# Patient Record
Sex: MALE | Race: WHITE | NOT HISPANIC OR LATINO | Employment: FULL TIME | URBAN - METROPOLITAN AREA
[De-identification: names, ages, dates, MRNs, and addresses within clinical notes are randomized per-mention and may not be internally consistent; named-entity substitution may affect disease eponyms.]

---

## 2020-02-19 ENCOUNTER — TELEPHONE (OUTPATIENT)
Dept: FAMILY MEDICINE CLINIC | Facility: CLINIC | Age: 50
End: 2020-02-19

## 2020-02-21 ENCOUNTER — OFFICE VISIT (OUTPATIENT)
Dept: FAMILY MEDICINE CLINIC | Facility: CLINIC | Age: 50
End: 2020-02-21
Payer: COMMERCIAL

## 2020-02-21 VITALS
HEIGHT: 71 IN | OXYGEN SATURATION: 99 % | SYSTOLIC BLOOD PRESSURE: 126 MMHG | WEIGHT: 237.4 LBS | DIASTOLIC BLOOD PRESSURE: 76 MMHG | TEMPERATURE: 98.1 F | BODY MASS INDEX: 33.23 KG/M2 | HEART RATE: 75 BPM

## 2020-02-21 DIAGNOSIS — Z13.29 SCREENING FOR THYROID DISORDER: ICD-10-CM

## 2020-02-21 DIAGNOSIS — Z13.1 SCREENING FOR DIABETES MELLITUS: ICD-10-CM

## 2020-02-21 DIAGNOSIS — Z13.220 SCREENING FOR LIPID DISORDERS: ICD-10-CM

## 2020-02-21 DIAGNOSIS — Z13.6 SCREENING FOR CARDIOVASCULAR CONDITION: ICD-10-CM

## 2020-02-21 DIAGNOSIS — Z00.00 ANNUAL PHYSICAL EXAM: Primary | ICD-10-CM

## 2020-02-21 PROCEDURE — 99386 PREV VISIT NEW AGE 40-64: CPT | Performed by: NURSE PRACTITIONER

## 2020-02-21 PROCEDURE — 3008F BODY MASS INDEX DOCD: CPT | Performed by: NURSE PRACTITIONER

## 2020-02-21 NOTE — PATIENT INSTRUCTIONS

## 2020-02-21 NOTE — PROGRESS NOTES
ADULT ANNUAL [facility]    NAME: Simba Prabhakar  AGE: 51 y o  SEX: male  : 1970     DATE: 2020    Patient presents today for an annual physical   Establishing primary care  Several years since his last exam and or any blood work, other than biometric screenings through his employer  He does not have those blood work results, but states he believes they were normal   Physical assessment today as below  Screening lab work orders placed  Will fill out biometric form and fax once results received  Health maintenance reviewed  Overdue for dental and vision by 1 year  Patient does wear contacts  Denies any visual concerns  BMI noted:  33  Diet and exercise reviewed  Patient has lost 20-25 lb in the last few months  He currently eats a low carb diet that was started in October  No healthcare concerns today  Reassess in 1 year for annual physical   Return sooner as needed     Assessment and Plan:     Problem List Items Addressed This Visit     None      Visit Diagnoses     Annual physical exam    -  Primary    BMI 33 0-33 9,adult        Screening for lipid disorders        Relevant Orders    Lipid panel    Screening for thyroid disorder        Relevant Orders    TSH, 3rd generation with Free T4 reflex    Screening for diabetes mellitus        Relevant Orders    Comprehensive metabolic panel    Screening for cardiovascular condition        Relevant Orders    CBC and differential          Immunizations and preventive care screenings were discussed with patient today  Appropriate education was printed on patient's after visit summary  Counseling:  Alcohol/drug use: discussed moderation in alcohol intake, the recommendations for healthy alcohol use, and avoidance of illicit drug use  Dental Health: discussed importance of regular tooth brushing, flossing, and dental visits    · Exercise: the importance of regular exercise/physical activity was discussed  Recommend exercise 3-5 times per week for at least 30 minutes  BMI Counseling: Body mass index is 33 11 kg/m²  The BMI is above normal  Nutrition recommendations include decreasing portion sizes, encouraging healthy choices of fruits and vegetables, decreasing fast food intake and reducing intake of saturated and trans fat  Exercise recommendations include moderate physical activity 150 minutes/week  No pharmacotherapy was ordered  Return in 1 year (on 2/21/2021)  Chief Complaint:     Chief Complaint   Patient presents with    Physical Exam     Biometric Screening for work  He would like to establish care with our office  History of Present Illness:     Adult Annual Physical   Patient here for a comprehensive physical exam  The patient reports no problems  Diet and Physical Activity  · Diet/Nutrition: well balanced diet, low carb diet and consuming 3-5 servings of fruits/vegetables daily  · Exercise: walking and 3-4 times a week on average  Depression Screening  PHQ-9 Depression Screening    PHQ-9:    Frequency of the following problems over the past two weeks:       Little interest or pleasure in doing things:  0 - not at all  Feeling down, depressed, or hopeless:  0 - not at all  PHQ-2 Score:  0       General Health  · Sleep: gets 4-6 hours of sleep on average and Sleeps okay  Does a lot of traveling, so notes difficulty sleeping in different hotels at times  Sleeps short intervals at times  Recommend melatonin  · Hearing: normal - bilateral   · Vision: most recent eye exam >1 year ago and wears contacts  · Dental: no dental visits for >1 year   Health  · Symptoms include: none     Review of Systems:     Review of Systems   Constitutional: Negative  HENT: Negative  Respiratory: Negative  Cardiovascular: Negative  Gastrointestinal: Negative  Genitourinary: Negative  Neurological: Negative  Psychiatric/Behavioral: Negative  Past Medical History:     History reviewed  No pertinent past medical history  Past Surgical History:     History reviewed  No pertinent surgical history  Family History:     Family History   Problem Relation Age of Onset    Heart disease Father       Social History:        Social History     Socioeconomic History    Marital status: /Civil Union     Spouse name: None    Number of children: None    Years of education: None    Highest education level: None   Occupational History    None   Social Needs    Financial resource strain: None    Food insecurity:     Worry: None     Inability: None    Transportation needs:     Medical: None     Non-medical: None   Tobacco Use    Smoking status: Never Smoker    Smokeless tobacco: Never Used   Substance and Sexual Activity    Alcohol use: Yes     Frequency: 2-3 times a week     Drinks per session: 1 or 2    Drug use: Never    Sexual activity: Yes   Lifestyle    Physical activity:     Days per week: None     Minutes per session: None    Stress: None   Relationships    Social connections:     Talks on phone: None     Gets together: None     Attends Holiness service: None     Active member of club or organization: None     Attends meetings of clubs or organizations: None     Relationship status: None    Intimate partner violence:     Fear of current or ex partner: None     Emotionally abused: None     Physically abused: None     Forced sexual activity: None   Other Topics Concern    None   Social History Narrative    None      Current Medications:     No current outpatient medications on file  No current facility-administered medications for this visit         Allergies:     No Known Allergies   Physical Exam:     /76 (BP Location: Left arm, Patient Position: Sitting, Cuff Size: Adult)   Pulse 75   Temp 98 1 °F (36 7 °C)   Ht 5' 11" (1 803 m)   Wt 108 kg (237 lb 6 4 oz)   SpO2 99%   BMI 33 11 kg/m²     Physical Exam   Constitutional: He is oriented to person, place, and time  Vital signs are normal  He appears well-developed and well-nourished  HENT:   Right Ear: Tympanic membrane and ear canal normal    Left Ear: Tympanic membrane and ear canal normal    Nose: Nose normal    Mouth/Throat: Uvula is midline, oropharynx is clear and moist and mucous membranes are normal    Eyes: Pupils are equal, round, and reactive to light  Conjunctivae and EOM are normal    Neck: Carotid bruit is not present  No thyromegaly present  Cardiovascular: Normal rate, regular rhythm and normal heart sounds  Negative for lower extremity edema   Pulmonary/Chest: Effort normal and breath sounds normal  No respiratory distress  Lymphadenopathy:        Head (right side): No submandibular and no tonsillar adenopathy present  Head (left side): No submandibular and no tonsillar adenopathy present  He has no cervical adenopathy  Neurological: He is alert and oriented to person, place, and time  He has normal strength  No cranial nerve deficit or sensory deficit  Coordination and gait normal    Reflex Scores:       Brachioradialis reflexes are 2+ on the right side and 2+ on the left side  Patellar reflexes are 2+ on the right side and 2+ on the left side  Skin: Skin is warm, dry and intact  Scattered, normal appearing nevi  Patient does follow with Dermatology for yearly mapping   Psychiatric: He has a normal mood and affect  Thought content normal    Nursing note and vitals reviewed        KHADRA Barbosa  [facility] [address] [facility] [address]

## 2020-02-25 DIAGNOSIS — R73.01 ELEVATED FASTING GLUCOSE: Primary | ICD-10-CM

## 2020-02-25 LAB
ALBUMIN SERPL-MCNC: 4.5 G/DL (ref 3.6–5.1)
ALBUMIN/GLOB SERPL: 1.8 (CALC) (ref 1–2.5)
ALP SERPL-CCNC: 90 U/L (ref 36–130)
ALT SERPL-CCNC: 28 U/L (ref 9–46)
AST SERPL-CCNC: 25 U/L (ref 10–40)
BASOPHILS # BLD AUTO: 42 CELLS/UL (ref 0–200)
BASOPHILS NFR BLD AUTO: 0.6 %
BILIRUB SERPL-MCNC: 0.3 MG/DL (ref 0.2–1.2)
BUN SERPL-MCNC: 18 MG/DL (ref 7–25)
BUN/CREAT SERPL: ABNORMAL (CALC) (ref 6–22)
CALCIUM SERPL-MCNC: 10 MG/DL (ref 8.6–10.3)
CHLORIDE SERPL-SCNC: 107 MMOL/L (ref 98–110)
CHOLEST SERPL-MCNC: 215 MG/DL
CHOLEST/HDLC SERPL: 4.8 (CALC)
CO2 SERPL-SCNC: 25 MMOL/L (ref 20–32)
CREAT SERPL-MCNC: 0.89 MG/DL (ref 0.6–1.35)
EOSINOPHIL # BLD AUTO: 140 CELLS/UL (ref 15–500)
EOSINOPHIL NFR BLD AUTO: 2 %
ERYTHROCYTE [DISTWIDTH] IN BLOOD BY AUTOMATED COUNT: 12.5 % (ref 11–15)
GLOBULIN SER CALC-MCNC: 2.5 G/DL (CALC) (ref 1.9–3.7)
GLUCOSE SERPL-MCNC: 104 MG/DL (ref 65–99)
HCT VFR BLD AUTO: 46.8 % (ref 38.5–50)
HDLC SERPL-MCNC: 45 MG/DL
HGB BLD-MCNC: 15.6 G/DL (ref 13.2–17.1)
LDLC SERPL CALC-MCNC: 147 MG/DL (CALC)
LYMPHOCYTES # BLD AUTO: 1659 CELLS/UL (ref 850–3900)
LYMPHOCYTES NFR BLD AUTO: 23.7 %
MCH RBC QN AUTO: 30.4 PG (ref 27–33)
MCHC RBC AUTO-ENTMCNC: 33.3 G/DL (ref 32–36)
MCV RBC AUTO: 91.1 FL (ref 80–100)
MONOCYTES # BLD AUTO: 644 CELLS/UL (ref 200–950)
MONOCYTES NFR BLD AUTO: 9.2 %
NEUTROPHILS # BLD AUTO: 4515 CELLS/UL (ref 1500–7800)
NEUTROPHILS NFR BLD AUTO: 64.5 %
NONHDLC SERPL-MCNC: 170 MG/DL (CALC)
PLATELET # BLD AUTO: 335 THOUSAND/UL (ref 140–400)
PMV BLD REES-ECKER: 10.3 FL (ref 7.5–12.5)
POTASSIUM SERPL-SCNC: 5.2 MMOL/L (ref 3.5–5.3)
PROT SERPL-MCNC: 7 G/DL (ref 6.1–8.1)
RBC # BLD AUTO: 5.14 MILLION/UL (ref 4.2–5.8)
SL AMB EGFR AFRICAN AMERICAN: 117 ML/MIN/1.73M2
SL AMB EGFR NON AFRICAN AMERICAN: 101 ML/MIN/1.73M2
SODIUM SERPL-SCNC: 139 MMOL/L (ref 135–146)
TRIGL SERPL-MCNC: 114 MG/DL
TSH SERPL-ACNC: 3.73 MIU/L (ref 0.4–4.5)
WBC # BLD AUTO: 7 THOUSAND/UL (ref 3.8–10.8)

## 2020-03-13 LAB
EST. AVERAGE GLUCOSE BLD GHB EST-MCNC: 105 (CALC)
EST. AVERAGE GLUCOSE BLD GHB EST-SCNC: 5.8 (CALC)
HBA1C MFR BLD: 5.3 % OF TOTAL HGB

## 2020-07-15 ENCOUNTER — DOCTOR'S OFFICE (OUTPATIENT)
Dept: URBAN - METROPOLITAN AREA CLINIC 153 | Facility: CLINIC | Age: 50
Setting detail: OPHTHALMOLOGY
End: 2020-07-15

## 2020-07-15 DIAGNOSIS — H52.13: ICD-10-CM

## 2020-07-15 DIAGNOSIS — H52.4: ICD-10-CM

## 2020-07-15 PROCEDURE — SELFPAYVIS VISION VISIT SELF PAY: Performed by: OPTOMETRIST

## 2020-07-15 PROCEDURE — 92310 CONTACT LENS FITTING OU: CPT | Performed by: OPTOMETRIST

## 2020-07-15 ASSESSMENT — REFRACTION_MANIFEST
OS_VA1: 20/20
OD_ADD: +2.50
OS_AXIS: 020
OS_CYLINDER: -0.75
OD_VA1: 20/20
OD_AXIS: 110
OU_VA: 20/20
OD_SPHERE: -1.75
OS_ADD: +2.50
OS_SPHERE: -1.75
OD_CYLINDER: -0.75

## 2020-07-15 ASSESSMENT — REFRACTION_AUTOREFRACTION
OS_CYLINDER: -0.75
OD_AXIS: 113
OS_SPHERE: -1.75
OD_SPHERE: -1.75
OS_AXIS: 024
OD_CYLINDER: -0.75

## 2020-07-15 ASSESSMENT — REFRACTION_CURRENTRX
OS_SPHERE: -1.75
OD_SPHERE: -2.00
OD_AXIS: 110
OD_ADD: +2.50
OS_OVR_VA: 20/
OS_CYLINDER: -0.75
OS_ADD: +2.50
OD_OVR_VA: 20/
OS_AXIS: 020
OD_CYLINDER: -0.75

## 2020-07-15 ASSESSMENT — VISUAL ACUITY
OD_BCVA: 20/20
OS_BCVA: 20/20-1

## 2020-07-15 ASSESSMENT — SPHEQUIV_DERIVED
OD_SPHEQUIV: -2.125
OS_SPHEQUIV: -2.125
OD_SPHEQUIV: -2.125
OS_SPHEQUIV: -2.125

## 2020-07-15 ASSESSMENT — CONFRONTATIONAL VISUAL FIELD TEST (CVF)
OD_FINDINGS: FULL
OS_FINDINGS: FULL

## 2020-10-06 ENCOUNTER — IMMUNIZATIONS (OUTPATIENT)
Dept: FAMILY MEDICINE CLINIC | Facility: CLINIC | Age: 50
End: 2020-10-06
Payer: COMMERCIAL

## 2020-10-06 DIAGNOSIS — Z23 NEED FOR VACCINATION: Primary | ICD-10-CM

## 2020-10-06 PROCEDURE — 90686 IIV4 VACC NO PRSV 0.5 ML IM: CPT | Performed by: NURSE PRACTITIONER

## 2020-10-06 PROCEDURE — 90471 IMMUNIZATION ADMIN: CPT | Performed by: NURSE PRACTITIONER

## 2020-11-02 ENCOUNTER — OFFICE VISIT (OUTPATIENT)
Dept: FAMILY MEDICINE CLINIC | Facility: CLINIC | Age: 50
End: 2020-11-02
Payer: COMMERCIAL

## 2020-11-02 VITALS
SYSTOLIC BLOOD PRESSURE: 122 MMHG | WEIGHT: 242.6 LBS | OXYGEN SATURATION: 98 % | BODY MASS INDEX: 33.96 KG/M2 | DIASTOLIC BLOOD PRESSURE: 90 MMHG | HEART RATE: 84 BPM | HEIGHT: 71 IN | TEMPERATURE: 97.1 F

## 2020-11-02 DIAGNOSIS — H00.015 HORDEOLUM EXTERNUM OF LEFT LOWER EYELID: Primary | ICD-10-CM

## 2020-11-02 PROCEDURE — 1036F TOBACCO NON-USER: CPT | Performed by: NURSE PRACTITIONER

## 2020-11-02 PROCEDURE — 99213 OFFICE O/P EST LOW 20 MIN: CPT | Performed by: NURSE PRACTITIONER

## 2020-11-02 PROCEDURE — 3008F BODY MASS INDEX DOCD: CPT | Performed by: NURSE PRACTITIONER

## 2020-11-02 RX ORDER — OFLOXACIN 3 MG/ML
1 SOLUTION/ DROPS OPHTHALMIC 4 TIMES DAILY
Qty: 5 ML | Refills: 0 | Status: SHIPPED | OUTPATIENT
Start: 2020-11-02

## 2024-03-08 ENCOUNTER — OFFICE VISIT (OUTPATIENT)
Dept: FAMILY MEDICINE CLINIC | Facility: CLINIC | Age: 54
End: 2024-03-08
Payer: COMMERCIAL

## 2024-03-08 VITALS
OXYGEN SATURATION: 98 % | BODY MASS INDEX: 35.13 KG/M2 | HEART RATE: 100 BPM | TEMPERATURE: 97.5 F | HEIGHT: 70 IN | WEIGHT: 245.4 LBS | SYSTOLIC BLOOD PRESSURE: 120 MMHG | DIASTOLIC BLOOD PRESSURE: 76 MMHG

## 2024-03-08 DIAGNOSIS — Z13.1 SCREENING FOR DIABETES MELLITUS: ICD-10-CM

## 2024-03-08 DIAGNOSIS — Z23 ENCOUNTER FOR IMMUNIZATION: ICD-10-CM

## 2024-03-08 DIAGNOSIS — Z13.29 SCREENING FOR THYROID DISORDER: ICD-10-CM

## 2024-03-08 DIAGNOSIS — Z12.11 SCREEN FOR COLON CANCER: ICD-10-CM

## 2024-03-08 DIAGNOSIS — Z12.5 SCREENING FOR PROSTATE CANCER: ICD-10-CM

## 2024-03-08 DIAGNOSIS — Z13.0 SCREENING, ANEMIA, DEFICIENCY, IRON: ICD-10-CM

## 2024-03-08 DIAGNOSIS — E78.5 HYPERLIPIDEMIA, UNSPECIFIED HYPERLIPIDEMIA TYPE: ICD-10-CM

## 2024-03-08 DIAGNOSIS — Z00.00 ANNUAL PHYSICAL EXAM: Primary | ICD-10-CM

## 2024-03-08 DIAGNOSIS — E55.9 VITAMIN D DEFICIENCY: ICD-10-CM

## 2024-03-08 PROCEDURE — 90750 HZV VACC RECOMBINANT IM: CPT

## 2024-03-08 PROCEDURE — 90471 IMMUNIZATION ADMIN: CPT

## 2024-03-08 PROCEDURE — 99396 PREV VISIT EST AGE 40-64: CPT | Performed by: NURSE PRACTITIONER

## 2024-03-08 NOTE — PROGRESS NOTES
ADULT ANNUAL PHYSICAL  [facility]    NAME: Enrrique Diaz  AGE: 53 y.o. SEX: male  : 1970     DATE: 3/8/2024     Assessment and Plan:   Comprehensive physical exam  PMH and chronic conditions reviewed  Medications reconciled  Preventative care and recommended screenings as below   Fasting lab orders placed  Continue annual physicals  Follow-up sooner as needed  Problem List Items Addressed This Visit    None  Visit Diagnoses       Annual physical exam    -  Primary    Screening, anemia, deficiency, iron        Relevant Orders    CBC and differential    Screening for diabetes mellitus        Relevant Orders    Comprehensive metabolic panel    Screening for thyroid disorder        Relevant Orders    TSH, 3rd generation with Free T4 reflex    Hyperlipidemia, unspecified hyperlipidemia type        Relevant Orders    Lipid panel    Screen for colon cancer        Relevant Orders    Ambulatory Referral to Gastroenterology    Encounter for immunization        Relevant Orders    Zoster Vaccine Recombinant IM    Screening for prostate cancer        Relevant Orders    PSA, Total Screen    Vitamin D deficiency        Relevant Orders    Vitamin D 25 hydroxy              Immunizations and preventive care screenings were discussed with patient today. Appropriate education was printed on patient's after visit summary.    Discussed risks and benefits of prostate cancer screening. We discussed the controversial history of PSA screening for prostate cancer in the [place] as well as the risk of over detection and over treatment of prostate cancer by way of PSA screening.  The patient understands that PSA blood testing is an imperfect way to screen for prostate cancer and that elevated PSA levels in the blood may also be caused by infection, inflammation, prostatic trauma or manipulation, urological procedures, or by benign prostatic enlargement.    The role of the  digital rectal examination in prostate cancer screening was also discussed and I discussed with him that there is large interobserver variability in the findings of digital rectal examination.    Counseling:  Alcohol/drug use: discussed moderation in alcohol intake, the recommendations for healthy alcohol use, and avoidance of illicit drug use.  Dental Health: discussed importance of regular tooth brushing, flossing, and dental visits.  Injury prevention: discussed safety/seat belts, safety helmets, smoke detectors, carbon dioxide detectors, and smoking near bedding or upholstery.  Sexual health: discussed sexually transmitted diseases, partner selection, use of condoms, avoidance of unintended pregnancy, and contraceptive alternatives.  Exercise: the importance of regular exercise/physical activity was discussed. Recommend exercise 3-5 times per week for at least 30 minutes.       Depression Screening and Follow-up Plan: Patient was screened for depression during today's encounter. They screened negative with a PHQ-2 score of 0.        Return in about 1 year (around 3/8/2025) for Annual physical.     Chief Complaint:     Chief Complaint   Patient presents with    Physical Exam      History of Present Illness:     Adult Annual Physical   Patient here for a comprehensive physical exam. The patient reports no problems.    Diet and Physical Activity  Diet/Nutrition: well balanced diet and working on healthy change .   Exercise: walking.      Depression Screening  PHQ-2/9 Depression Screening    Little interest or pleasure in doing things: 0 - not at all  Feeling down, depressed, or hopeless: 0 - not at all  PHQ-2 Score: 0  PHQ-2 Interpretation: Negative depression screen       General Health  Sleep: sleeps well and does travel for work - average one week monthly .   Hearing: normal - bilateral.  Vision: no vision problems, goes for regular eye exams, wears glasses, and mono vision .   Dental: regular dental visits.     Immunizations:  Tdap 2017; Shingrex series started today     Health  Symptoms include: none    Advanced Care Planning  Do you have an advanced directive? no  Do you have a durable medical power of ? no  ACP document given to patient? no     Review of Systems:     Review of Systems   Constitutional:  Positive for fatigue. Negative for activity change and appetite change. Unexpected weight change: 8 pounds since last physical 2020.  HENT: Negative.     Eyes: Negative.    Respiratory: Negative.     Cardiovascular: Negative.    Gastrointestinal: Negative.    Genitourinary: Negative.    Musculoskeletal: Negative.    Skin: Negative.    Neurological: Negative.    Psychiatric/Behavioral: Negative.        Past Medical History:     History reviewed. No pertinent past medical history.   Past Surgical History:     History reviewed. No pertinent surgical history.   Family History:     Family History   Problem Relation Age of Onset    Heart disease Father       Social History:     Social History     Socioeconomic History    Marital status: /Civil Union     Spouse name: None    Number of children: None    Years of education: None    Highest education level: None   Occupational History    None   Tobacco Use    Smoking status: Never    Smokeless tobacco: Never   Vaping Use    Vaping status: Never Used   Substance and Sexual Activity    Alcohol use: Yes    Drug use: Never    Sexual activity: Yes   Other Topics Concern    None   Social History Narrative    None     Social Determinants of Health     Financial Resource Strain: Not on file   Food Insecurity: Not on file   Transportation Needs: Not on file   Physical Activity: Not on file   Stress: Not on file   Social Connections: Not on file   Intimate Partner Violence: Not on file   Housing Stability: Not on file      Current Medications:     No current outpatient medications on file.     No current facility-administered medications for this visit.      Allergies:  "    No Known Allergies   Physical Exam:     /76 (BP Location: Left arm, Patient Position: Sitting, Cuff Size: Adult)   Pulse 100   Temp 97.5 °F (36.4 °C)   Ht 5' 10\" (1.778 m)   Wt 111 kg (245 lb 6.4 oz)   SpO2 98%   BMI 35.21 kg/m²     Physical Exam  Vitals and nursing note reviewed.   Constitutional:       General: He is not in acute distress.     Appearance: Normal appearance. He is well-developed and well-groomed. He is not ill-appearing.   HENT:      Head: Normocephalic.      Right Ear: Tympanic membrane, ear canal and external ear normal.      Left Ear: Tympanic membrane, ear canal and external ear normal.      Nose: Nose normal.      Mouth/Throat:      Mouth: Mucous membranes are moist.      Pharynx: Oropharynx is clear.   Eyes:      Conjunctiva/sclera: Conjunctivae normal.      Pupils: Pupils are equal, round, and reactive to light.   Neck:      Thyroid: No thyromegaly.      Vascular: No carotid bruit.   Cardiovascular:      Rate and Rhythm: Normal rate and regular rhythm.      Pulses:           Posterior tibial pulses are 2+ on the right side and 2+ on the left side.      Heart sounds: Normal heart sounds.   Pulmonary:      Effort: Pulmonary effort is normal. No respiratory distress.      Breath sounds: Normal breath sounds and air entry.   Musculoskeletal:      Right lower leg: No edema.      Left lower leg: No edema.   Lymphadenopathy:      Cervical: No cervical adenopathy.   Skin:     General: Skin is warm and dry.      Comments: Multiple Nevi  Gets yearly skin checks: [facility]   Neurological:      General: No focal deficit present.      Mental Status: He is alert and oriented to person, place, and time.   Psychiatric:         Attention and Perception: Attention normal.         Mood and Affect: Mood normal.         Behavior: Behavior normal.         Thought Content: Thought content normal.         Judgment: Judgment normal.          KHADRA Uribe  [facility] " [facility]

## 2024-03-12 ENCOUNTER — PREP FOR PROCEDURE (OUTPATIENT)
Age: 54
End: 2024-03-12

## 2024-03-12 ENCOUNTER — TELEPHONE (OUTPATIENT)
Age: 54
End: 2024-03-12

## 2024-03-12 DIAGNOSIS — Z12.11 SCREENING FOR COLON CANCER: Primary | ICD-10-CM

## 2024-03-12 NOTE — TELEPHONE ENCOUNTER
Scheduled date of colonoscopy (as of today):5/9/2024  Physician performing colonoscopy:   Location of colonoscopy: [facility]  Bowel prep reviewed with patient: Henna Valdez  Instructions reviewed with patient by: Henna Valdez  Clearances: N/A    Miralax Dulcolax prep sent via [employer]

## 2024-03-12 NOTE — TELEPHONE ENCOUNTER
03/12/24  Screened by: Henna Valdez    Referring Provider KHADRA Foss    Pre- Screening:     There is no height or weight on file to calculate BMI.  Has patient been referred for a routine screening Colonoscopy? yes  Is the patient between 45-75 years old? yes      Previous Colonoscopy no   If yes:    Date: N/A    Facility: N/A    Reason: N/A        Does the patient want to see a Gastroenterologist prior to their procedure OR are they having any GI symptoms? no    Has the patient been hospitalized or had abdominal surgery in the past 6 months? no    Does the patient use supplemental oxygen? no    Does the patient take Coumadin, Lovenox, Plavix, Elliquis, Xarelto, or other blood thinning medication? no    Has the patient had a stroke, cardiac event, or stent placed in the past year? no        If patient is between 45yrs - 49yrs, please advise patient that we will have to confirm benefits & coverage with their insurance company for a routine screening colonoscopy.

## 2024-03-13 ENCOUNTER — APPOINTMENT (OUTPATIENT)
Dept: LAB | Facility: CLINIC | Age: 54
End: 2024-03-13
Payer: COMMERCIAL

## 2024-03-13 DIAGNOSIS — Z13.1 SCREENING FOR DIABETES MELLITUS: ICD-10-CM

## 2024-03-13 DIAGNOSIS — E55.9 VITAMIN D DEFICIENCY: ICD-10-CM

## 2024-03-13 DIAGNOSIS — E78.5 HYPERLIPIDEMIA, UNSPECIFIED HYPERLIPIDEMIA TYPE: ICD-10-CM

## 2024-03-13 DIAGNOSIS — Z13.29 SCREENING FOR THYROID DISORDER: ICD-10-CM

## 2024-03-13 DIAGNOSIS — Z13.0 SCREENING, ANEMIA, DEFICIENCY, IRON: ICD-10-CM

## 2024-03-13 DIAGNOSIS — Z12.5 SCREENING FOR PROSTATE CANCER: ICD-10-CM

## 2024-03-13 LAB
25(OH)D3 SERPL-MCNC: 21.6 NG/ML (ref 30–100)
ALBUMIN SERPL BCP-MCNC: 4.2 G/DL (ref 3.5–5)
ALP SERPL-CCNC: 66 U/L (ref 34–104)
ALT SERPL W P-5'-P-CCNC: 28 U/L (ref 7–52)
ANION GAP SERPL CALCULATED.3IONS-SCNC: 8 MMOL/L (ref 4–13)
AST SERPL W P-5'-P-CCNC: 27 U/L (ref 13–39)
BASOPHILS # BLD AUTO: 0.04 THOUSANDS/ÂΜL (ref 0–0.1)
BASOPHILS NFR BLD AUTO: 1 % (ref 0–1)
BILIRUB SERPL-MCNC: 0.38 MG/DL (ref 0.2–1)
BUN SERPL-MCNC: 17 MG/DL (ref 5–25)
CALCIUM SERPL-MCNC: 9.5 MG/DL (ref 8.4–10.2)
CHLORIDE SERPL-SCNC: 105 MMOL/L (ref 96–108)
CHOLEST SERPL-MCNC: 155 MG/DL
CO2 SERPL-SCNC: 26 MMOL/L (ref 21–32)
CREAT SERPL-MCNC: 0.98 MG/DL (ref 0.6–1.3)
EOSINOPHIL # BLD AUTO: 0.11 THOUSAND/ÂΜL (ref 0–0.61)
EOSINOPHIL NFR BLD AUTO: 2 % (ref 0–6)
ERYTHROCYTE [DISTWIDTH] IN BLOOD BY AUTOMATED COUNT: 12 % (ref 11.6–15.1)
GFR SERPL CREATININE-BSD FRML MDRD: 88 ML/MIN/1.73SQ M
GLUCOSE P FAST SERPL-MCNC: 94 MG/DL (ref 65–99)
HCT VFR BLD AUTO: 45 % (ref 36.5–49.3)
HDLC SERPL-MCNC: 37 MG/DL
HGB BLD-MCNC: 15.1 G/DL (ref 12–17)
IMM GRANULOCYTES # BLD AUTO: 0.02 THOUSAND/UL (ref 0–0.2)
IMM GRANULOCYTES NFR BLD AUTO: 0 % (ref 0–2)
LDLC SERPL CALC-MCNC: 98 MG/DL (ref 0–100)
LYMPHOCYTES # BLD AUTO: 1.5 THOUSANDS/ÂΜL (ref 0.6–4.47)
LYMPHOCYTES NFR BLD AUTO: 26 % (ref 14–44)
MCH RBC QN AUTO: 30.8 PG (ref 26.8–34.3)
MCHC RBC AUTO-ENTMCNC: 33.6 G/DL (ref 31.4–37.4)
MCV RBC AUTO: 92 FL (ref 82–98)
MONOCYTES # BLD AUTO: 0.56 THOUSAND/ÂΜL (ref 0.17–1.22)
MONOCYTES NFR BLD AUTO: 10 % (ref 4–12)
NEUTROPHILS # BLD AUTO: 3.62 THOUSANDS/ÂΜL (ref 1.85–7.62)
NEUTS SEG NFR BLD AUTO: 61 % (ref 43–75)
NONHDLC SERPL-MCNC: 118 MG/DL
NRBC BLD AUTO-RTO: 0 /100 WBCS
PLATELET # BLD AUTO: 313 THOUSANDS/UL (ref 149–390)
PMV BLD AUTO: 10.4 FL (ref 8.9–12.7)
POTASSIUM SERPL-SCNC: 4.6 MMOL/L (ref 3.5–5.3)
PROT SERPL-MCNC: 6.8 G/DL (ref 6.4–8.4)
PSA SERPL-MCNC: 0.8 NG/ML (ref 0–4)
RBC # BLD AUTO: 4.91 MILLION/UL (ref 3.88–5.62)
SODIUM SERPL-SCNC: 139 MMOL/L (ref 135–147)
T4 FREE SERPL-MCNC: 0.68 NG/DL (ref 0.61–1.12)
TRIGL SERPL-MCNC: 102 MG/DL
TSH SERPL DL<=0.05 MIU/L-ACNC: 5.12 UIU/ML (ref 0.45–4.5)
WBC # BLD AUTO: 5.85 THOUSAND/UL (ref 4.31–10.16)

## 2024-03-13 PROCEDURE — G0103 PSA SCREENING: HCPCS

## 2024-03-13 PROCEDURE — 82306 VITAMIN D 25 HYDROXY: CPT

## 2024-03-13 PROCEDURE — 80061 LIPID PANEL: CPT

## 2024-03-13 PROCEDURE — 85025 COMPLETE CBC W/AUTO DIFF WBC: CPT

## 2024-03-13 PROCEDURE — 84443 ASSAY THYROID STIM HORMONE: CPT

## 2024-03-13 PROCEDURE — 36415 COLL VENOUS BLD VENIPUNCTURE: CPT

## 2024-03-13 PROCEDURE — 84439 ASSAY OF FREE THYROXINE: CPT

## 2024-03-13 PROCEDURE — 80053 COMPREHEN METABOLIC PANEL: CPT

## 2024-03-14 ENCOUNTER — TELEPHONE (OUTPATIENT)
Age: 54
End: 2024-03-14

## 2024-03-20 DIAGNOSIS — E55.9 VITAMIN D DEFICIENCY: ICD-10-CM

## 2024-03-20 DIAGNOSIS — Z13.29 SCREENING FOR THYROID DISORDER: ICD-10-CM

## 2024-03-20 DIAGNOSIS — R79.89 ELEVATED TSH: Primary | ICD-10-CM

## 2024-04-25 ENCOUNTER — ANESTHESIA EVENT (OUTPATIENT)
Dept: ANESTHESIOLOGY | Facility: HOSPITAL | Age: 54
End: 2024-04-25

## 2024-04-25 ENCOUNTER — ANESTHESIA (OUTPATIENT)
Dept: ANESTHESIOLOGY | Facility: HOSPITAL | Age: 54
End: 2024-04-25

## 2024-05-06 ENCOUNTER — TELEPHONE (OUTPATIENT)
Age: 54
End: 2024-05-06

## 2024-06-05 ENCOUNTER — ANESTHESIA EVENT (OUTPATIENT)
Dept: ANESTHESIOLOGY | Facility: HOSPITAL | Age: 54
End: 2024-06-05

## 2024-06-05 ENCOUNTER — ANESTHESIA (OUTPATIENT)
Dept: ANESTHESIOLOGY | Facility: HOSPITAL | Age: 54
End: 2024-06-05

## 2024-06-11 ENCOUNTER — TELEPHONE (OUTPATIENT)
Dept: GASTROENTEROLOGY | Facility: CLINIC | Age: 54
End: 2024-06-11

## 2024-06-11 ENCOUNTER — OFFICE VISIT (OUTPATIENT)
Dept: FAMILY MEDICINE CLINIC | Facility: CLINIC | Age: 54
End: 2024-06-11
Payer: COMMERCIAL

## 2024-06-11 VITALS
WEIGHT: 250 LBS | TEMPERATURE: 97.3 F | HEART RATE: 87 BPM | OXYGEN SATURATION: 98 % | HEIGHT: 71 IN | BODY MASS INDEX: 35 KG/M2 | DIASTOLIC BLOOD PRESSURE: 90 MMHG | SYSTOLIC BLOOD PRESSURE: 144 MMHG

## 2024-06-11 DIAGNOSIS — Z23 ENCOUNTER FOR IMMUNIZATION: ICD-10-CM

## 2024-06-11 DIAGNOSIS — M67.432 GANGLION CYST OF VOLAR ASPECT OF LEFT WRIST: Primary | ICD-10-CM

## 2024-06-11 PROCEDURE — 99213 OFFICE O/P EST LOW 20 MIN: CPT

## 2024-06-11 PROCEDURE — 90471 IMMUNIZATION ADMIN: CPT

## 2024-06-11 PROCEDURE — 90750 HZV VACC RECOMBINANT IM: CPT

## 2024-06-11 RX ORDER — ERGOCALCIFEROL 1.25 MG/1
CAPSULE ORAL
COMMUNITY

## 2024-06-11 RX ORDER — DIPHENOXYLATE HYDROCHLORIDE AND ATROPINE SULFATE 2.5; .025 MG/1; MG/1
1 TABLET ORAL DAILY
COMMUNITY

## 2024-06-11 NOTE — ASSESSMENT & PLAN NOTE
Patient's wrist examined today which is consistent with a ganglion cyst of his left wrist.  It is not causing him pain and does not appear to have any signs of infection today on exam.  He has normal range of motion.  Discussed findings with patient today and discussed conservative management is first-line for a cyst like this.  He can try heating pad and try to keep the wrist active.  He should avoid repetitive motions such as golfing, which he did golf prior to this cyst happening.  If it starts to grow in size, affects his range of motion or certainly becomes painful he is to let us know right away and we would consider referral to hand surgery for removal.  Discussed he should not try to remove or pop the cyst on his own as it will likely recur.  Patient agreeable with this plan today and understands management plan.  He will follow-up if needed.

## 2024-06-11 NOTE — TELEPHONE ENCOUNTER
Confirming Upcoming Procedure: colonoscopy on 06/19/24  Physician performing: Dr ribeiro  [facility]   Prep: miralax prep     LVM and call back number

## 2024-06-11 NOTE — PROGRESS NOTES
Ambulatory Visit  Name: Enrrique Diaz      : 1970      MRN: [id number]  Encounter Provider: Marlen Cordova PA-C  Encounter Date: 2024   Encounter department: [facility]    Assessment & Plan   1. Ganglion cyst of volar aspect of left wrist  Assessment & Plan:  Patient's wrist examined today which is consistent with a ganglion cyst of his left wrist.  It is not causing him pain and does not appear to have any signs of infection today on exam.  He has normal range of motion.  Discussed findings with patient today and discussed conservative management is first-line for a cyst like this.  He can try heating pad and try to keep the wrist active.  He should avoid repetitive motions such as golfing, which he did golf prior to this cyst happening.  If it starts to grow in size, affects his range of motion or certainly becomes painful he is to let us know right away and we would consider referral to hand surgery for removal.  Discussed he should not try to remove or pop the cyst on his own as it will likely recur.  Patient agreeable with this plan today and understands management plan.  He will follow-up if needed.  2. Encounter for immunization  -     Zoster Vaccine Recombinant IM     Patient's blood pressure mildly elevated today in clinic, 144/90.  He has never had a problem with blood pressure in the past.  I would like him to come in for a nurse visit in 2 weeks to get his blood pressure rechecked.  He may also get a blood pressure cuff at home to monitor his blood pressure and can bring readings into his nurse visit.  If still elevated, would recommend closer follow-up and possible medication use in the future.  Patient understands and will schedule follow-up.  History of Present Illness     Patient presents today for evaluation of a mass on his left wrist that he noticed about a week ago. The lump is not painful and does not feel warm or have any redness associated with it. He  "has never had anything similar in the past. No numbness or tingling.     He is also due for his 2nd Shingles vaccine.         Review of Systems   Musculoskeletal:  Positive for joint swelling.       Objective     /90   Pulse 87   Temp (!) 97.3 °F (36.3 °C)   Ht 5' 10.87\" (1.8 m)   Wt 113 kg (250 lb)   SpO2 98%   BMI 35.00 kg/m²     Physical Exam  Constitutional:       General: He is not in acute distress.     Appearance: Normal appearance. He is not ill-appearing.   HENT:      Head: Normocephalic and atraumatic.   Pulmonary:      Effort: Pulmonary effort is normal. No respiratory distress.   Musculoskeletal:      Right wrist: Deformity present. No swelling, effusion, tenderness, snuff box tenderness or crepitus. Normal range of motion. Normal pulse.        Arms:    Skin:     Coloration: Skin is not cyanotic or pale.   Neurological:      General: No focal deficit present.      Mental Status: He is alert and oriented to person, place, and time.   Psychiatric:         Mood and Affect: Mood normal.         Behavior: Behavior normal.         Judgment: Judgment normal.       Administrative Statements       Marlen Cordova PA-C  [facility]    "

## 2024-06-13 ENCOUNTER — LAB (OUTPATIENT)
Dept: LAB | Facility: CLINIC | Age: 54
End: 2024-06-13
Payer: COMMERCIAL

## 2024-06-13 DIAGNOSIS — R79.89 ELEVATED TSH: ICD-10-CM

## 2024-06-13 DIAGNOSIS — Z13.29 SCREENING FOR THYROID DISORDER: ICD-10-CM

## 2024-06-13 DIAGNOSIS — E55.9 VITAMIN D DEFICIENCY: ICD-10-CM

## 2024-06-13 LAB
25(OH)D3 SERPL-MCNC: 44.9 NG/ML (ref 30–100)
T4 FREE SERPL-MCNC: 0.82 NG/DL (ref 0.61–1.12)
TSH SERPL DL<=0.05 MIU/L-ACNC: 5.55 UIU/ML (ref 0.45–4.5)

## 2024-06-13 PROCEDURE — 82306 VITAMIN D 25 HYDROXY: CPT

## 2024-06-13 PROCEDURE — 36415 COLL VENOUS BLD VENIPUNCTURE: CPT

## 2024-06-13 PROCEDURE — 84443 ASSAY THYROID STIM HORMONE: CPT

## 2024-06-13 PROCEDURE — 84439 ASSAY OF FREE THYROXINE: CPT

## 2024-06-18 RX ORDER — SODIUM CHLORIDE 9 MG/ML
125 INJECTION, SOLUTION INTRAVENOUS CONTINUOUS
Status: CANCELLED | OUTPATIENT
Start: 2024-06-18

## 2024-06-19 ENCOUNTER — ANESTHESIA (OUTPATIENT)
Dept: GASTROENTEROLOGY | Facility: MEDICAL CENTER | Age: 54
End: 2024-06-19

## 2024-06-19 ENCOUNTER — ANESTHESIA EVENT (OUTPATIENT)
Dept: GASTROENTEROLOGY | Facility: MEDICAL CENTER | Age: 54
End: 2024-06-19

## 2024-06-19 ENCOUNTER — HOSPITAL ENCOUNTER (OUTPATIENT)
Dept: GASTROENTEROLOGY | Facility: MEDICAL CENTER | Age: 54
Setting detail: OUTPATIENT SURGERY
Discharge: HOME | End: 2024-06-19
Attending: INTERNAL MEDICINE
Payer: COMMERCIAL

## 2024-06-19 VITALS
DIASTOLIC BLOOD PRESSURE: 84 MMHG | HEART RATE: 69 BPM | HEIGHT: 71 IN | SYSTOLIC BLOOD PRESSURE: 148 MMHG | TEMPERATURE: 97.3 F | OXYGEN SATURATION: 100 % | WEIGHT: 250 LBS | RESPIRATION RATE: 17 BRPM | BODY MASS INDEX: 35 KG/M2

## 2024-06-19 DIAGNOSIS — Z12.11 SCREENING FOR COLON CANCER: ICD-10-CM

## 2024-06-19 PROCEDURE — 45385 COLONOSCOPY W/LESION REMOVAL: CPT | Performed by: INTERNAL MEDICINE

## 2024-06-19 PROCEDURE — 88305 TISSUE EXAM BY PATHOLOGIST: CPT | Performed by: STUDENT IN AN ORGANIZED HEALTH CARE EDUCATION/TRAINING PROGRAM

## 2024-06-19 RX ORDER — PROPOFOL 10 MG/ML
INJECTION, EMULSION INTRAVENOUS AS NEEDED
Status: DISCONTINUED | OUTPATIENT
Start: 2024-06-19 | End: 2024-06-19

## 2024-06-19 RX ORDER — LIDOCAINE HCL/PF 100 MG/5ML
SYRINGE (ML) INJECTION AS NEEDED
Status: DISCONTINUED | OUTPATIENT
Start: 2024-06-19 | End: 2024-06-19

## 2024-06-19 RX ORDER — SODIUM CHLORIDE 9 MG/ML
125 INJECTION, SOLUTION INTRAVENOUS CONTINUOUS
Status: DISCONTINUED | OUTPATIENT
Start: 2024-06-19 | End: 2024-06-23 | Stop reason: HOSPADM

## 2024-06-19 RX ORDER — SODIUM CHLORIDE 9 MG/ML
INJECTION, SOLUTION INTRAVENOUS CONTINUOUS PRN
Status: DISCONTINUED | OUTPATIENT
Start: 2024-06-19 | End: 2024-06-19

## 2024-06-19 RX ADMIN — SODIUM CHLORIDE 125 ML/HR: 0.9 INJECTION, SOLUTION INTRAVENOUS at 11:53

## 2024-06-19 RX ADMIN — PROPOFOL 50 MG: 10 INJECTION, EMULSION INTRAVENOUS at 12:26

## 2024-06-19 RX ADMIN — PROPOFOL 50 MG: 10 INJECTION, EMULSION INTRAVENOUS at 12:18

## 2024-06-19 RX ADMIN — SODIUM CHLORIDE: 0.9 INJECTION, SOLUTION INTRAVENOUS at 12:13

## 2024-06-19 RX ADMIN — Medication 40 MG: at 12:18

## 2024-06-19 RX ADMIN — LIDOCAINE HYDROCHLORIDE 100 MG: 20 INJECTION INTRAVENOUS at 12:13

## 2024-06-19 RX ADMIN — PROPOFOL 100 MG: 10 INJECTION, EMULSION INTRAVENOUS at 12:13

## 2024-06-19 RX ADMIN — PROPOFOL 50 MG: 10 INJECTION, EMULSION INTRAVENOUS at 12:21

## 2024-06-19 RX ADMIN — PROPOFOL 50 MG: 10 INJECTION, EMULSION INTRAVENOUS at 12:15

## 2024-06-19 NOTE — ANESTHESIA POSTPROCEDURE EVALUATION
Post-Op Assessment Note    CV Status:  Stable  Pain Score: 0    Pain management: adequate    Multimodal analgesia used between 6 hours prior to anesthesia start to PACU discharge    Mental Status:  Alert   Hydration Status:  Stable   PONV Controlled:  None   Airway Patency:  Patent  Two or more mitigation strategies used for obstructive sleep apnea   Post Op Vitals Reviewed: Yes    No anethesia notable event occurred.    Staff: Anesthesiologist               /90 (06/19/24 1256)    Temp      Pulse 81 (06/19/24 1256)   Resp 20 (06/19/24 1256)    SpO2 98 % (06/19/24 1256)

## 2024-06-19 NOTE — H&P
"History and Physical -  [facility]  Enrrique Diaz 53 y.o. male MRN: [id number]                  HPI: Enrrique Diaz is a 53 y.o. year old male who presents for CRC screening.      REVIEW OF SYSTEMS: Per the HPI, and otherwise unremarkable.    Historical Information   History reviewed. No pertinent past medical history.  Past Surgical History:   Procedure Laterality Date    WISDOM TOOTH EXTRACTION       Social History   Social History     Substance and Sexual Activity   Alcohol Use Yes     Social History     Substance and Sexual Activity   Drug Use Never     Social History     Tobacco Use   Smoking Status Never   Smokeless Tobacco Never     Family History   Problem Relation Age of Onset    Heart disease Father        Meds/Allergies     Not in a hospital admission.    No Known Allergies    Objective     Blood pressure 141/75, pulse 73, temperature (!) 97.3 °F (36.3 °C), temperature source Temporal, resp. rate 18, height 5' 11\" (1.803 m), weight 113 kg (250 lb), SpO2 97%.      PHYSICAL EXAMINATION:    General Appearance:   Alert, cooperative, no distress   HEENT:  Normocephalic, atraumatic, anicteric. Neck supple, symmetrical, trachea midline.   Lungs:   Equal chest rise and unlabored breathing, normal effort, no coughing.   Cardiovascular:   No visualized JVD.   Abdomen:   No abdominal distension.   Skin:   No jaundice, rashes, or lesions.    Musculoskeletal:   Normal range of motion visualized.   Psych:  Normal affect and normal insight.   Neuro:  Alert and appropriate.           ASSESSMENT/PLAN:  This is a 53 y.o. year old male here for colonoscopy, and he is stable and optimized for his procedure.        "

## 2024-06-19 NOTE — ANESTHESIA PREPROCEDURE EVALUATION
Procedure:  COLONOSCOPY    Relevant Problems   No relevant active problems        Physical Exam    Airway    Mallampati score: II  TM Distance: >3 FB  Neck ROM: full     Dental       Cardiovascular      Pulmonary      Other Findings        Anesthesia Plan  ASA Score- 2     Anesthesia Type- IV sedation with anesthesia with ASA Monitors.         Additional Monitors:     Airway Plan:            Plan Factors-Exercise tolerance (METS): >4 METS.    Chart reviewed.                      Induction- intravenous.    Postoperative Plan-     Perioperative Resuscitation Plan - Level 1 - Full Code.       Informed Consent- Anesthetic plan and risks discussed with patient.  I personally reviewed this patient with the CRNA. Discussed and agreed on the Anesthesia Plan with the CRNA..    NPO appropriate. Discussed benefits/risks of monitored anesthetic care and discussed providing a dynamic level of mild to deep sedation. Risks include awareness, airway obstruction, aspiration which may necessitate conversion to general anesthesia. All questions answered. Patient understands and wishes to proceed.    Anesthesia plan and consent discussed with Enrrique who expressed understanding and agreement. Risks/benefits and alternatives discussed with patient including possible PONV, sore throat, damage to teeth/lips/gums and possibility of rare anesthetic and surgical emergencies.        
Congestion and rhinorrhea present.      Mouth/Throat:      Pharynx: Posterior oropharyngeal erythema present. No oropharyngeal exudate.   Eyes:      General:         Right eye: No discharge.         Left eye: No discharge.      Conjunctiva/sclera: Conjunctivae normal.   Cardiovascular:      Rate and Rhythm: Normal rate and regular rhythm.      Pulses: Normal pulses.      Heart sounds: Normal heart sounds.   Pulmonary:      Effort: Pulmonary effort is normal.      Breath sounds: Normal breath sounds.   Abdominal:      General: Abdomen is flat. Bowel sounds are normal.      Palpations: Abdomen is soft.      Tenderness: There is no abdominal tenderness.   Musculoskeletal:      Cervical back: No tenderness.   Lymphadenopathy:      Cervical: No cervical adenopathy.         Frandy Christian MD          Please note, this report has been partially produced using speech recognition software and may cause  and /or contain errors related to that system including grammar, punctuation and spelling as well as words and phrases that may seem inappropriate. If there are questions or concerns please feel free to contact me to clarify.

## 2024-06-19 NOTE — DISCHARGE INSTRUCTIONS
A Hemostasis clip was placed in your colon at a polyp site. This is used to prevent bleeding. It will come out of your body in your bowel movement over the course of the next few weeks. If you would need an MRI within the next month, please notify the MRI tech of this clip, and show them the card we provide for you.

## 2024-06-20 ENCOUNTER — TELEPHONE (OUTPATIENT)
Age: 54
End: 2024-06-20

## 2024-06-20 DIAGNOSIS — R79.89 ELEVATED TSH: Primary | ICD-10-CM

## 2024-06-21 PROCEDURE — 88305 TISSUE EXAM BY PATHOLOGIST: CPT | Performed by: STUDENT IN AN ORGANIZED HEALTH CARE EDUCATION/TRAINING PROGRAM

## 2024-06-23 ENCOUNTER — PREP FOR PROCEDURE (OUTPATIENT)
Dept: GASTROENTEROLOGY | Facility: CLINIC | Age: 54
End: 2024-06-23

## 2024-06-23 DIAGNOSIS — Z12.11 SCREENING FOR COLON CANCER: ICD-10-CM

## 2024-06-23 DIAGNOSIS — D12.6 COLON ADENOMAS: Primary | ICD-10-CM

## 2024-06-23 NOTE — RESULT ENCOUNTER NOTE
Please schedule another colonoscopy with me in 3-6 months.  Thanks!      Melonie Osuna, the polyps we removed were precancerous, but they were completely excised. As we discussed after the procedure, you began retching and threw up near the end of the case, so I had to rush and finish to avoid aspiration.  As a result, I did not get an adequate view of the last 20 cm of the colon.  Therefore, I would like you to come back in 3-6 months for another colonoscopy.  I realize that this is an inconvenience, but it is important to get a good exam, especially given the polyps we already found.  Please let me know if you have questions.  I will ask the office to schedule the procedure.  -VT

## 2024-06-24 NOTE — TELEPHONE ENCOUNTER
Patient concerned about his TSH levels. States he has a family history thyroid disease. Asking if PCP can call to discuss this with him.

## 2024-07-10 ENCOUNTER — TELEPHONE (OUTPATIENT)
Dept: GASTROENTEROLOGY | Facility: CLINIC | Age: 54
End: 2024-07-10

## 2024-07-10 NOTE — TELEPHONE ENCOUNTER
----- Message from Jaymie KWON sent at 6/24/2024 11:16 AM EDT -----  Repeat colonoscopy in 3 months, due: 9/17/2024 incomplete procedure.    Thanks  ----- Message -----  From: Clara Ro MD  Sent: 6/23/2024   2:14 PM EDT  To: KHADRA Uribe; #    Please schedule another colonoscopy with me in 3-6 months.  Thanks!      Melonie Osuna, the polyps we removed were precancerous, but they were completely excised. As we discussed after the procedure, you began retching and threw up near the end of the case, so I had to rush and finish to avoid aspiration.  As a result, I did not get an adequate view of the last 20 cm of the colon.  Therefore, I would like you to come back in 3-6 months for another colonoscopy.  I realize that this is an inconvenience, but it is important to get a good exam, especially given the polyps we already found.  Please let me know if you have questions.  I will ask the office to schedule the procedure.  -VT

## 2024-07-10 NOTE — TELEPHONE ENCOUNTER
Scheduled date of colonoscopy (as of today) 09/18/2024  Physician performing: Dr. Ro  Location of procedure:  [facility]   Instructions given to patient:  miralax send in [employer]   Clearances: n/a

## 2024-09-04 ENCOUNTER — ANESTHESIA EVENT (OUTPATIENT)
Dept: ANESTHESIOLOGY | Facility: HOSPITAL | Age: 54
End: 2024-09-04

## 2024-09-04 ENCOUNTER — ANESTHESIA (OUTPATIENT)
Dept: ANESTHESIOLOGY | Facility: HOSPITAL | Age: 54
End: 2024-09-04

## 2024-09-06 ENCOUNTER — APPOINTMENT (OUTPATIENT)
Dept: LAB | Facility: CLINIC | Age: 54
End: 2024-09-06
Payer: COMMERCIAL

## 2024-09-06 DIAGNOSIS — R79.89 ELEVATED TSH: ICD-10-CM

## 2024-09-06 LAB
T4 FREE SERPL-MCNC: 0.69 NG/DL (ref 0.61–1.12)
TSH SERPL DL<=0.05 MIU/L-ACNC: 6.27 UIU/ML (ref 0.45–4.5)

## 2024-09-06 PROCEDURE — 84443 ASSAY THYROID STIM HORMONE: CPT

## 2024-09-06 PROCEDURE — 36415 COLL VENOUS BLD VENIPUNCTURE: CPT

## 2024-09-06 PROCEDURE — 84439 ASSAY OF FREE THYROXINE: CPT

## 2024-09-09 DIAGNOSIS — R79.89 ELEVATED TSH: Primary | ICD-10-CM

## 2024-09-10 ENCOUNTER — TELEPHONE (OUTPATIENT)
Dept: GASTROENTEROLOGY | Facility: CLINIC | Age: 54
End: 2024-09-10

## 2024-09-10 NOTE — TELEPHONE ENCOUNTER
Confirming Upcoming Procedure: colon on 09/18/2024  Physician performing: Dr Ro  [facility]   Prep: miralax prep       Spoke with patient no question at the moment

## 2024-09-12 ENCOUNTER — APPOINTMENT (OUTPATIENT)
Dept: LAB | Facility: CLINIC | Age: 54
End: 2024-09-12
Payer: COMMERCIAL

## 2024-09-12 DIAGNOSIS — R79.89 ELEVATED TSH: ICD-10-CM

## 2024-09-12 PROCEDURE — 86800 THYROGLOBULIN ANTIBODY: CPT

## 2024-09-12 PROCEDURE — 36415 COLL VENOUS BLD VENIPUNCTURE: CPT

## 2024-09-12 PROCEDURE — 86376 MICROSOMAL ANTIBODY EACH: CPT

## 2024-09-13 ENCOUNTER — TELEPHONE (OUTPATIENT)
Age: 54
End: 2024-09-13

## 2024-09-13 LAB
THYROGLOB AB SERPL-ACNC: <1 IU/ML (ref 0–0.9)
THYROPEROXIDASE AB SERPL-ACNC: 12 IU/ML (ref 0–34)

## 2024-09-13 NOTE — TELEPHONE ENCOUNTER
Pt calling in, reports he has colonoscopy scheduled for 9/18 at [facility]. He reports last colonoscopy in June towards end of procedure has retching and threw up- he was told at that time he should have procedures done in the hospital.     Please advise if pt should be rescheduled at the hospital for colonoscopy or if okay to proceed as scheduled?

## 2024-09-17 ENCOUNTER — TELEPHONE (OUTPATIENT)
Age: 54
End: 2024-09-17

## 2024-09-17 NOTE — TELEPHONE ENCOUNTER
Patient calling to get his thyroid lab results. Advised that Vineet has not reviewed them yet but will call him when she does. Gio states that he is frustrated b/c he is so tired and would like to talk with her about it. Suggested an OV but was unable to find an appt before October. Patient would like to be seen sooner if able or do a virtual visit if able to discuss what he is feeling with Vineet. Patient also mentioned having his testosterone checked. Please follow up with patient.

## 2024-09-23 ENCOUNTER — OFFICE VISIT (OUTPATIENT)
Dept: FAMILY MEDICINE CLINIC | Facility: CLINIC | Age: 54
End: 2024-09-23
Payer: COMMERCIAL

## 2024-09-23 VITALS
SYSTOLIC BLOOD PRESSURE: 136 MMHG | WEIGHT: 260.6 LBS | HEART RATE: 78 BPM | DIASTOLIC BLOOD PRESSURE: 78 MMHG | TEMPERATURE: 98.3 F | HEIGHT: 71 IN | BODY MASS INDEX: 36.48 KG/M2 | OXYGEN SATURATION: 99 %

## 2024-09-23 DIAGNOSIS — E03.9 HYPOTHYROIDISM, UNSPECIFIED TYPE: Primary | ICD-10-CM

## 2024-09-23 DIAGNOSIS — Z82.49 FAMILY HISTORY OF EARLY CAD: ICD-10-CM

## 2024-09-23 PROCEDURE — 99213 OFFICE O/P EST LOW 20 MIN: CPT | Performed by: NURSE PRACTITIONER

## 2024-09-23 RX ORDER — LEVOTHYROXINE SODIUM 25 UG/1
25 TABLET ORAL
Qty: 100 TABLET | Refills: 3 | Status: SHIPPED | OUTPATIENT
Start: 2024-09-23

## 2024-09-24 PROBLEM — E03.9 HYPOTHYROIDISM: Status: ACTIVE | Noted: 2024-09-24

## 2024-09-24 PROBLEM — Z82.49 FAMILY HISTORY OF EARLY CAD: Status: ACTIVE | Noted: 2024-09-24

## 2024-09-24 NOTE — ASSESSMENT & PLAN NOTE
Recent lab work reviewed again today  Slow increase in TSH over the last few months: Currently 6.27  T4 has been trending down-now 0.69  Antibodies normal at 12  Reports symptoms consistent with hypothyroidism  We will trial low-dose levothyroxine: 25 mcg daily  Advised to take on an empty stomach-avoid food 1 hour prior as well  We will recheck TSH/T4 in 6 to 8 weeks  He will follow-up sooner if his symptoms change, persist or worsen    Orders:    TSH, 3rd generation; Future    T4, free; Future    levothyroxine 25 mcg tablet; Take 1 tablet (25 mcg total) by mouth daily in the early morning

## 2024-09-24 NOTE — PROGRESS NOTES
Ambulatory Visit  Name: Enrrique Diaz      : 1970      MRN: [id number]  Encounter Provider: KHADRA Uribe  Encounter Date: 2024   Encounter department: [facility]    Assessment & Plan  Family history of early CAD  Patient report significant family history of early CAD  Dad requiring carotid endarterectomy in his 50s  Patient with no cardiovascular symptoms  Denies chest pain, pressure, SOB  Discussed available screening/imaging  Will check carotid ultrasound and a CT coronary calcium score today  Advised to verify coverage with insurance to assess potential out-of-pocket    Orders:    VAS carotid complete study; Future    CT coronary calcium score; Future    Hypothyroidism, unspecified type  Recent lab work reviewed again today  Slow increase in TSH over the last few months: Currently 6.27  T4 has been trending down-now 0.69  Antibodies normal at 12  Reports symptoms consistent with hypothyroidism  We will trial low-dose levothyroxine: 25 mcg daily  Advised to take on an empty stomach-avoid food 1 hour prior as well  We will recheck TSH/T4 in 6 to 8 weeks  He will follow-up sooner if his symptoms change, persist or worsen    Orders:    TSH, 3rd generation; Future    T4, free; Future    levothyroxine 25 mcg tablet; Take 1 tablet (25 mcg total) by mouth daily in the early morning       History of Present Illness     Patient presents today to discuss recent thyroid labs.  Reports he has been noting chronic symptoms of hypothyroidism: Most specifically fatigue and brain fog  Reports significant family history of thyroid disease            Review of Systems   Constitutional:  Positive for fatigue. Negative for activity change and appetite change.   Respiratory: Negative.     Cardiovascular: Negative.    Neurological: Negative.    Psychiatric/Behavioral: Negative.             Objective     /78 (BP Location: Left arm, Patient Position: Sitting, Cuff Size: Large)   Pulse  "78   Temp 98.3 °F (36.8 °C) (Temporal)   Ht 5' 11\" (1.803 m)   Wt 118 kg (260 lb 9.6 oz)   SpO2 99%   BMI 36.35 kg/m²     Physical Exam  Vitals and nursing note reviewed.   Constitutional:       General: He is not in acute distress.     Appearance: Normal appearance. He is not ill-appearing.   HENT:      Right Ear: Tympanic membrane and ear canal normal.      Left Ear: Tympanic membrane and ear canal normal.   Neck:      Thyroid: No thyromegaly.      Vascular: No carotid bruit.   Cardiovascular:      Rate and Rhythm: Normal rate and regular rhythm.      Heart sounds: Normal heart sounds.   Pulmonary:      Effort: Pulmonary effort is normal. No respiratory distress.      Breath sounds: Normal breath sounds and air entry.   Skin:     General: Skin is warm and dry.   Neurological:      General: No focal deficit present.      Mental Status: He is alert and oriented to person, place, and time.   Psychiatric:         Attention and Perception: Attention normal.         Mood and Affect: Mood normal.         Behavior: Behavior normal.         Thought Content: Thought content normal.         Judgment: Judgment normal.         "

## 2024-09-24 NOTE — ASSESSMENT & PLAN NOTE
Patient report significant family history of early CAD  Dad requiring carotid endarterectomy in his 50s  Patient with no cardiovascular symptoms  Denies chest pain, pressure, SOB  Discussed available screening/imaging  Will check carotid ultrasound and a CT coronary calcium score today  Advised to verify coverage with insurance to assess potential out-of-pocket    Orders:    VAS carotid complete study; Future    CT coronary calcium score; Future

## 2024-11-04 ENCOUNTER — TELEPHONE (OUTPATIENT)
Age: 54
End: 2024-11-04

## 2024-11-04 NOTE — TELEPHONE ENCOUNTER
Spoke with pt due the provider schedule change pt was schedule with Dr. Ro on 11/14 at .  Reschedule with Dr. Bucio on 11/14 at  location.  Pt agree.

## 2024-11-11 ENCOUNTER — HOSPITAL ENCOUNTER (OUTPATIENT)
Dept: CT IMAGING | Facility: HOSPITAL | Age: 54
Discharge: HOME | End: 2024-11-11
Payer: COMMERCIAL

## 2024-11-11 ENCOUNTER — HOSPITAL ENCOUNTER (OUTPATIENT)
Dept: NON INVASIVE DIAGNOSTICS | Facility: HOSPITAL | Age: 54
Discharge: HOME | End: 2024-11-11
Payer: COMMERCIAL

## 2024-11-11 DIAGNOSIS — Z82.49 FAMILY HISTORY OF EARLY CAD: ICD-10-CM

## 2024-11-11 PROCEDURE — 93880 EXTRACRANIAL BILAT STUDY: CPT | Performed by: SURGERY

## 2024-11-11 PROCEDURE — 75571 CT HRT W/O DYE W/CA TEST: CPT

## 2024-11-11 PROCEDURE — 93880 EXTRACRANIAL BILAT STUDY: CPT

## 2024-11-12 ENCOUNTER — APPOINTMENT (OUTPATIENT)
Dept: LAB | Facility: CLINIC | Age: 54
End: 2024-11-12
Payer: COMMERCIAL

## 2024-11-12 DIAGNOSIS — E03.9 HYPOTHYROIDISM, UNSPECIFIED TYPE: ICD-10-CM

## 2024-11-12 DIAGNOSIS — E03.9 HYPOTHYROIDISM, UNSPECIFIED TYPE: Primary | ICD-10-CM

## 2024-11-12 LAB
T4 FREE SERPL-MCNC: 0.79 NG/DL (ref 0.61–1.12)
TSH SERPL DL<=0.05 MIU/L-ACNC: 3.88 UIU/ML (ref 0.45–4.5)

## 2024-11-12 PROCEDURE — 84443 ASSAY THYROID STIM HORMONE: CPT

## 2024-11-12 PROCEDURE — 36415 COLL VENOUS BLD VENIPUNCTURE: CPT

## 2024-11-12 PROCEDURE — 84439 ASSAY OF FREE THYROXINE: CPT

## 2024-11-14 ENCOUNTER — ANESTHESIA EVENT (OUTPATIENT)
Dept: GASTROENTEROLOGY | Facility: HOSPITAL | Age: 54
End: 2024-11-14
Payer: COMMERCIAL

## 2024-11-14 ENCOUNTER — HOSPITAL ENCOUNTER (OUTPATIENT)
Dept: GASTROENTEROLOGY | Facility: HOSPITAL | Age: 54
Setting detail: OUTPATIENT SURGERY
End: 2024-11-14
Attending: INTERNAL MEDICINE
Payer: COMMERCIAL

## 2024-11-14 ENCOUNTER — ANESTHESIA (OUTPATIENT)
Dept: GASTROENTEROLOGY | Facility: HOSPITAL | Age: 54
End: 2024-11-14
Payer: COMMERCIAL

## 2024-11-14 ENCOUNTER — RESULTS FOLLOW-UP (OUTPATIENT)
Dept: FAMILY MEDICINE CLINIC | Facility: CLINIC | Age: 54
End: 2024-11-14

## 2024-11-14 VITALS
OXYGEN SATURATION: 100 % | TEMPERATURE: 98.2 F | RESPIRATION RATE: 15 BRPM | HEART RATE: 76 BPM | SYSTOLIC BLOOD PRESSURE: 108 MMHG | DIASTOLIC BLOOD PRESSURE: 76 MMHG

## 2024-11-14 DIAGNOSIS — D12.6 COLON ADENOMAS: ICD-10-CM

## 2024-11-14 DIAGNOSIS — Z12.11 SCREENING FOR COLON CANCER: ICD-10-CM

## 2024-11-14 PROCEDURE — 45385 COLONOSCOPY W/LESION REMOVAL: CPT | Performed by: INTERNAL MEDICINE

## 2024-11-14 PROCEDURE — 88305 TISSUE EXAM BY PATHOLOGIST: CPT | Performed by: STUDENT IN AN ORGANIZED HEALTH CARE EDUCATION/TRAINING PROGRAM

## 2024-11-14 RX ORDER — SODIUM CHLORIDE, SODIUM LACTATE, POTASSIUM CHLORIDE, CALCIUM CHLORIDE 600; 310; 30; 20 MG/100ML; MG/100ML; MG/100ML; MG/100ML
INJECTION, SOLUTION INTRAVENOUS CONTINUOUS PRN
Status: DISCONTINUED | OUTPATIENT
Start: 2024-11-14 | End: 2024-11-14

## 2024-11-14 RX ORDER — LIDOCAINE HYDROCHLORIDE 20 MG/ML
INJECTION, SOLUTION EPIDURAL; INFILTRATION; INTRACAUDAL; PERINEURAL AS NEEDED
Status: DISCONTINUED | OUTPATIENT
Start: 2024-11-14 | End: 2024-11-14

## 2024-11-14 RX ORDER — SODIUM CHLORIDE, SODIUM LACTATE, POTASSIUM CHLORIDE, CALCIUM CHLORIDE 600; 310; 30; 20 MG/100ML; MG/100ML; MG/100ML; MG/100ML
50 INJECTION, SOLUTION INTRAVENOUS CONTINUOUS
Status: DISCONTINUED | OUTPATIENT
Start: 2024-11-14 | End: 2024-11-18 | Stop reason: HOSPADM

## 2024-11-14 RX ORDER — SODIUM CHLORIDE, SODIUM LACTATE, POTASSIUM CHLORIDE, CALCIUM CHLORIDE 600; 310; 30; 20 MG/100ML; MG/100ML; MG/100ML; MG/100ML
75 INJECTION, SOLUTION INTRAVENOUS CONTINUOUS
Status: CANCELLED | OUTPATIENT
Start: 2024-11-14

## 2024-11-14 RX ORDER — PROPOFOL 10 MG/ML
INJECTION, EMULSION INTRAVENOUS AS NEEDED
Status: DISCONTINUED | OUTPATIENT
Start: 2024-11-14 | End: 2024-11-14

## 2024-11-14 RX ORDER — METOCLOPRAMIDE HYDROCHLORIDE 5 MG/ML
INJECTION INTRAMUSCULAR; INTRAVENOUS AS NEEDED
Status: DISCONTINUED | OUTPATIENT
Start: 2024-11-14 | End: 2024-11-14

## 2024-11-14 RX ORDER — SODIUM CHLORIDE, SODIUM LACTATE, POTASSIUM CHLORIDE, CALCIUM CHLORIDE 600; 310; 30; 20 MG/100ML; MG/100ML; MG/100ML; MG/100ML
50 INJECTION, SOLUTION INTRAVENOUS CONTINUOUS
Status: CANCELLED | OUTPATIENT
Start: 2024-11-14

## 2024-11-14 RX ADMIN — Medication 40 MG: at 14:03

## 2024-11-14 RX ADMIN — METOCLOPRAMIDE 10 MG: 5 INJECTION, SOLUTION INTRAMUSCULAR; INTRAVENOUS at 13:50

## 2024-11-14 RX ADMIN — PROPOFOL 150 MG: 10 INJECTION, EMULSION INTRAVENOUS at 13:58

## 2024-11-14 RX ADMIN — PROPOFOL 20 MG: 10 INJECTION, EMULSION INTRAVENOUS at 14:12

## 2024-11-14 RX ADMIN — PROPOFOL 30 MG: 10 INJECTION, EMULSION INTRAVENOUS at 14:09

## 2024-11-14 RX ADMIN — PROPOFOL 20 MG: 10 INJECTION, EMULSION INTRAVENOUS at 14:16

## 2024-11-14 RX ADMIN — PROPOFOL 30 MG: 10 INJECTION, EMULSION INTRAVENOUS at 14:06

## 2024-11-14 RX ADMIN — SODIUM CHLORIDE, SODIUM LACTATE, POTASSIUM CHLORIDE, AND CALCIUM CHLORIDE 50 ML/HR: .6; .31; .03; .02 INJECTION, SOLUTION INTRAVENOUS at 13:06

## 2024-11-14 RX ADMIN — PROPOFOL 20 MG: 10 INJECTION, EMULSION INTRAVENOUS at 14:19

## 2024-11-14 RX ADMIN — LIDOCAINE HYDROCHLORIDE 50 MG: 20 INJECTION, SOLUTION EPIDURAL; INFILTRATION; INTRACAUDAL; PERINEURAL at 13:58

## 2024-11-14 RX ADMIN — SODIUM CHLORIDE, SODIUM LACTATE, POTASSIUM CHLORIDE, AND CALCIUM CHLORIDE: .6; .31; .03; .02 INJECTION, SOLUTION INTRAVENOUS at 13:10

## 2024-11-14 RX ADMIN — PROPOFOL 50 MG: 10 INJECTION, EMULSION INTRAVENOUS at 14:03

## 2024-11-14 NOTE — ANESTHESIA PREPROCEDURE EVALUATION
Procedure:  COLONOSCOPY    Relevant Problems   ENDO   (+) Hypothyroidism      Orthopedic/Musculoskeletal   (+) Ganglion cyst of volar aspect of left wrist      Other   (+) Family history of early CAD        Physical Exam    Airway    Mallampati score: III  TM Distance: >3 FB  Neck ROM: full     Dental   No notable dental hx     Cardiovascular      Pulmonary      Other Findings        Anesthesia Plan  ASA Score- 2     Anesthesia Type- IV sedation with anesthesia with ASA Monitors.         Additional Monitors:     Airway Plan:            Plan Factors-Exercise tolerance (METS): >4 METS.    Chart reviewed. EKG reviewed. Imaging results reviewed. Existing labs reviewed. Patient summary reviewed.                  Induction- intravenous.    Postoperative Plan-         Informed Consent- Anesthetic plan and risks discussed with patient.  I personally reviewed this patient with the CRNA. Discussed and agreed on the Anesthesia Plan with the CRNA..

## 2024-11-14 NOTE — ANESTHESIA POSTPROCEDURE EVALUATION
Post-Op Assessment Note    CV Status:  Stable  Pain Score: 0    Pain management: adequate       Mental Status:  Alert and awake   Hydration Status:  Stable   PONV Controlled:  None   Airway Patency:  Patent     Post Op Vitals Reviewed: Yes    No anethesia notable event occurred.    Staff: CRNA       Last Filed PACU Vitals:  Vitals Value Taken Time   Temp     Pulse     BP     Resp     SpO2         Modified Jaz:  Activity: 2 (11/14/2024 12:53 PM)  Respiration: 2 (11/14/2024 12:53 PM)  Circulation: 2 (11/14/2024 12:53 PM)  Consciousness: 2 (11/14/2024 12:53 PM)  Oxygen Saturation: 2 (11/14/2024 12:53 PM)  Modified Jaz Score: 10 (11/14/2024 12:53 PM)

## 2024-11-14 NOTE — H&P
History and Physical -  [facility]  Enrrique Diaz 53 y.o. male MRN: [id number]                  HPI: Enrrique Diaz is a 53 y.o. year old male who presents for colon polyps      REVIEW OF SYSTEMS: Per the HPI, and otherwise unremarkable.    Historical Information   Past Medical History:   Diagnosis Date    Hypothyroid      Past Surgical History:   Procedure Laterality Date    WISDOM TOOTH EXTRACTION       Social History   Social History     Substance and Sexual Activity   Alcohol Use Yes     Social History     Substance and Sexual Activity   Drug Use Never     Social History     Tobacco Use   Smoking Status Never   Smokeless Tobacco Never     Family History   Problem Relation Age of Onset    Heart disease Father        Meds/Allergies     Not in a hospital admission.    No Known Allergies    Objective     Blood pressure 131/66, pulse 80, temperature 97.6 °F (36.4 °C), temperature source Temporal, resp. rate 16, SpO2 100%.      PHYSICAL EXAMINATION:    General Appearance:   Alert, cooperative, no distress   HEENT:  Normocephalic, atraumatic, anicteric. Neck supple, symmetrical, trachea midline.   Lungs:   Equal chest rise and unlabored breathing, normal effort, no coughing.   Cardiovascular:   No visualized JVD.   Abdomen:   No abdominal distension.   Skin:   No jaundice, rashes, or lesions.    Musculoskeletal:   Normal range of motion visualized.   Psych:  Normal affect and normal insight.   Neuro:  Alert and appropriate.           ASSESSMENT/PLAN:  This is a 53 y.o. year old male here for colonoscopy, and he is stable and optimized for his procedure.

## 2024-11-15 ENCOUNTER — OFFICE VISIT (OUTPATIENT)
Dept: FAMILY MEDICINE CLINIC | Facility: CLINIC | Age: 54
End: 2024-11-15
Payer: COMMERCIAL

## 2024-11-15 VITALS
SYSTOLIC BLOOD PRESSURE: 136 MMHG | WEIGHT: 261.8 LBS | OXYGEN SATURATION: 98 % | TEMPERATURE: 97.4 F | DIASTOLIC BLOOD PRESSURE: 82 MMHG | BODY MASS INDEX: 36.51 KG/M2 | HEART RATE: 87 BPM

## 2024-11-15 DIAGNOSIS — Z87.39 HISTORY OF OSGOOD-SCHLATTER DISEASE: ICD-10-CM

## 2024-11-15 DIAGNOSIS — G89.29 CHRONIC PAIN OF BOTH KNEES: ICD-10-CM

## 2024-11-15 DIAGNOSIS — M25.561 CHRONIC PAIN OF BOTH KNEES: ICD-10-CM

## 2024-11-15 DIAGNOSIS — M25.832 MASS OF JOINT OF LEFT WRIST: ICD-10-CM

## 2024-11-15 DIAGNOSIS — Z82.49 FAMILY HISTORY OF EARLY CAD: ICD-10-CM

## 2024-11-15 DIAGNOSIS — E78.5 HYPERLIPIDEMIA, UNSPECIFIED HYPERLIPIDEMIA TYPE: ICD-10-CM

## 2024-11-15 DIAGNOSIS — R91.1 NODULE OF LEFT LUNG: ICD-10-CM

## 2024-11-15 DIAGNOSIS — I25.10 CORONARY ARTERY CALCIFICATION SEEN ON CT SCAN: Primary | ICD-10-CM

## 2024-11-15 DIAGNOSIS — M25.562 CHRONIC PAIN OF BOTH KNEES: ICD-10-CM

## 2024-11-15 PROCEDURE — 99214 OFFICE O/P EST MOD 30 MIN: CPT | Performed by: NURSE PRACTITIONER

## 2024-11-15 NOTE — ANESTHESIA POSTPROCEDURE EVALUATION
Post-Op Assessment Note    CV Status:  Stable    Pain management: adequate       Mental Status:  Alert and awake   Hydration Status:  Euvolemic   PONV Controlled:  Controlled   Airway Patency:  Patent     Post Op Vitals Reviewed: Yes    No anethesia notable event occurred.    Staff: Anesthesiologist           Last Filed PACU Vitals:  Vitals Value Taken Time   Temp 98.2 °F (36.8 °C) 11/14/24 1440   Pulse 76 11/14/24 1440   /76 11/14/24 1440   Resp 15 11/14/24 1440   SpO2 100 % 11/14/24 1440       Modified Jaz:  Activity: 2 (11/14/2024  2:41 PM)  Respiration: 2 (11/14/2024  2:41 PM)  Circulation: 2 (11/14/2024  2:41 PM)  Consciousness: 2 (11/14/2024  2:41 PM)  Oxygen Saturation: 2 (11/14/2024  2:41 PM)  Modified Jaz Score: 10 (11/14/2024  2:41 PM)

## 2024-11-15 NOTE — PROGRESS NOTES
Name: Enrrique Diaz      : 1970      MRN: [id number]  Encounter Provider: KHADRA Uribe  Encounter Date: 11/15/2024   Encounter department: [facility]  :  Assessment & Plan  Coronary artery calcification seen on CT scan  Reviewed recent imaging  TOTAL coronary calcium score: 214   Risk factor reduction reviewed  Declined preventative statin as below  Referral to cardiology to further assess risk  Orders:    Ambulatory Referral to Cardiology; Future    Hyperlipidemia, unspecified hyperlipidemia type  Reviewed most recent lipid panel: 2024  Total 155; HDL 37; LDL 98  In light of his recent coronary CT, would recommend preventative statin  He declines at this time  Referral today for nutrition consult-as he is interested in dietary changes to decrease risk    Orders:    Ambulatory Referral to Nutrition Services; Future    Chronic pain of both knees  Patient with longstanding history of bilateral knee pain  Has had injections in the past  Referral to orthopedic today for further evaluation and possible injections if appropriate  Orders:    Ambulatory Referral to Orthopedic Surgery; Future    History of Osgood-Schlatter disease    Orders:    Ambulatory Referral to Orthopedic Surgery; Future    Mass of joint of left wrist  Small, palpable mass-left wrist  Painful with palpation and with certain wrist movement  Check ultrasound today  Likely refer to hand specialist  Orders:    US extremity soft tissue; Future    Nodule of left lung  Incidental 3 mm left lower lobe nodule noted on recent CT  Will [id number] in 12 years to assess for change  Orders:    CT lung nodule follow-up; Future    Family history of early CAD    Orders:    Ambulatory Referral to Cardiology; Future           History of Present Illness     Presents today to review recent imaging and lab work.  Notes some fatigue improvement since starting levothyroxine.      Review of Systems   Musculoskeletal:   Positive for arthralgias (Chronic, bilateral knee pain.  Ongoing for years.  Has seen orthopedics in the past and received injections).          Objective   /82 (BP Location: Left arm, Patient Position: Sitting, Cuff Size: Large)   Pulse 87   Temp (!) 97.4 °F (36.3 °C) (Temporal)   Wt 119 kg (261 lb 12.8 oz)   SpO2 98%   BMI 36.51 kg/m²      Physical Exam  Vitals and nursing note reviewed.   Constitutional:       General: He is not in acute distress.     Appearance: Normal appearance. He is well-developed. He is not ill-appearing.   Pulmonary:      Effort: Pulmonary effort is normal. No respiratory distress.   Skin:     General: Skin is warm and dry.      Comments: Left wrist-palpable mass   Neurological:      General: No focal deficit present.      Mental Status: He is alert and oriented to person, place, and time.   Psychiatric:         Attention and Perception: Attention normal.         Mood and Affect: Mood normal.         Behavior: Behavior normal.         Thought Content: Thought content normal.         Judgment: Judgment normal.

## 2024-11-18 ENCOUNTER — RESULTS FOLLOW-UP (OUTPATIENT)
Age: 54
End: 2024-11-18

## 2024-11-18 ENCOUNTER — APPOINTMENT (OUTPATIENT)
Dept: RADIOLOGY | Facility: MEDICAL CENTER | Age: 54
End: 2024-11-18
Payer: COMMERCIAL

## 2024-11-18 ENCOUNTER — OFFICE VISIT (OUTPATIENT)
Dept: OBGYN CLINIC | Facility: MEDICAL CENTER | Age: 54
End: 2024-11-18
Payer: COMMERCIAL

## 2024-11-18 VITALS
SYSTOLIC BLOOD PRESSURE: 149 MMHG | WEIGHT: 258 LBS | HEIGHT: 71 IN | HEART RATE: 102 BPM | DIASTOLIC BLOOD PRESSURE: 91 MMHG | BODY MASS INDEX: 36.12 KG/M2

## 2024-11-18 DIAGNOSIS — M25.561 CHRONIC PAIN OF BOTH KNEES: ICD-10-CM

## 2024-11-18 DIAGNOSIS — M25.562 CHRONIC PAIN OF BOTH KNEES: ICD-10-CM

## 2024-11-18 DIAGNOSIS — M17.0 PRIMARY OSTEOARTHRITIS OF BOTH KNEES: ICD-10-CM

## 2024-11-18 DIAGNOSIS — M25.561 PAIN IN BOTH KNEES, UNSPECIFIED CHRONICITY: Primary | ICD-10-CM

## 2024-11-18 DIAGNOSIS — M25.561 PAIN IN BOTH KNEES, UNSPECIFIED CHRONICITY: ICD-10-CM

## 2024-11-18 DIAGNOSIS — M25.562 PAIN IN BOTH KNEES, UNSPECIFIED CHRONICITY: ICD-10-CM

## 2024-11-18 DIAGNOSIS — Z87.39 HISTORY OF OSGOOD-SCHLATTER DISEASE: ICD-10-CM

## 2024-11-18 DIAGNOSIS — G89.29 CHRONIC PAIN OF BOTH KNEES: ICD-10-CM

## 2024-11-18 DIAGNOSIS — M25.562 PAIN IN BOTH KNEES, UNSPECIFIED CHRONICITY: Primary | ICD-10-CM

## 2024-11-18 PROCEDURE — 88305 TISSUE EXAM BY PATHOLOGIST: CPT | Performed by: STUDENT IN AN ORGANIZED HEALTH CARE EDUCATION/TRAINING PROGRAM

## 2024-11-18 PROCEDURE — 73564 X-RAY EXAM KNEE 4 OR MORE: CPT

## 2024-11-18 PROCEDURE — 99203 OFFICE O/P NEW LOW 30 MIN: CPT | Performed by: ORTHOPAEDIC SURGERY

## 2024-11-18 NOTE — PROGRESS NOTES
Ortho Sports Medicine Knee New Patient Visit     Assesment:     53 y.o. male bilateral knee patellofemoral osteoarthritis    Plan:    Findings today are consistent with bilateral knee moderate patellofemoral osteoarthritis. Imaging and prognosis was reviewed with the patient today. On physical exam he has maintained motion. Discussed treatment options including continued observation, low impact exercises, weight loss, bracing, anti-inflammatories, physical therapy, cortisone injection, visco injection, versus surgical intervention. Referral to physical therapy provided for stretching and strengthening exercises. He was encouraged to continue activities as tolerated. If pain persists, consider cortisone injection versus referral to total joint specialist. Follow up in 6-8 weeks if needed.    Conservative treatment:    Ice to knee for 20 minutes at least 1-2 times daily.  PT for ROM/strengthening to knee, hip and core.  OTC NSAIDS prn for pain.  Let pain guide gradual return activities.    Imaging:    All imaging from today was reviewed by myself and explained to the patient.       Injection:    No Injection planned at this time.      Surgery:     No surgery is recommended at this point, continue with conservative treatment plan as noted.      Follow up:    Return in about 6 weeks (around 12/30/2024), or if symptoms worsen or fail to improve.        Chief Complaint   Patient presents with    Left Knee - Pain    Right Knee - Pain       History of Present Illness:    The patient is a 53 y.o. male whose occupation is unknown, referred to me by their primary care physician, seen in clinic for consultation of bilateral knee pain.      Pain is located anterior, left knee worse than right.  The patient rates the pain as a 6/10.  The pain has been present for several years without known injury.      The pain is characterized as sharp, dull, achy, and throbbing.  The pain is present daily.      Pain is improved by rest and  NSAIDS.  Pain is aggravated by stairs and weight bearing.    Symptoms include grinding.     The patient has tried rest, ice, NSAIDS, and injection.          Knee Surgical History:  None    Past Medical, Social and Family History:  Past Medical History:   Diagnosis Date    Arthritis 2019    Both knees    Hypothyroid      Past Surgical History:   Procedure Laterality Date    WISDOM TOOTH EXTRACTION       No Known Allergies  Current Outpatient Medications on File Prior to Visit   Medication Sig Dispense Refill    Diclofenac Sodium (VOLTAREN) 1 % Apply 2 g topically 4 (four) times a day As needed.      levothyroxine 25 mcg tablet Take 1 tablet (25 mcg total) by mouth daily in the early morning 100 tablet 3    multivitamin (THERAGRAN) TABS Take 1 tablet by mouth daily      Vitamin D, Ergocalciferol, 06256 units CAPS Take by mouth       Current Facility-Administered Medications on File Prior to Visit   Medication Dose Route Frequency Provider Last Rate Last Admin    [DISCONTINUED] lactated ringers infusion  50 mL/hr Intravenous Continuous Pat Orozco MD 50 mL/hr at 11/14/24 1306 50 mL/hr at 11/14/24 1306     Social History     Socioeconomic History    Marital status: /Civil Union     Spouse name: Not on file    Number of children: Not on file    Years of education: Not on file    Highest education level: Not on file   Occupational History    Not on file   Tobacco Use    Smoking status: Never    Smokeless tobacco: Never   Vaping Use    Vaping status: Never Used   Substance and Sexual Activity    Alcohol use: Yes     Alcohol/week: 6.0 standard drinks of alcohol     Types: 6 Glasses of wine per week    Drug use: Never    Sexual activity: Yes     Partners: Female     Birth control/protection: None   Other Topics Concern    Not on file   Social History Narrative    Not on file     Social Drivers of Health     Financial Resource Strain: Not on file   Food Insecurity: Not on file   Transportation Needs: Not on  "file   Physical Activity: Not on file   Stress: Not on file   Social Connections: Not on file   Intimate Partner Violence: Not on file   Housing Stability: Not on file         I have reviewed the past medical, surgical, social and family history, medications and allergies as documented in the EMR.    Review of systems: ROS is negative other than that noted in the HPI.  Constitutional: Negative for fatigue and fever.   HENT: Negative for sore throat.    Respiratory: Negative for shortness of breath.    Cardiovascular: Negative for chest pain.   Gastrointestinal: Negative for abdominal pain.   Endocrine: Negative for cold intolerance and heat intolerance.   Genitourinary: Negative for flank pain.   Musculoskeletal: Negative for back pain.   Skin: Negative for rash.   Allergic/Immunologic: Negative for immunocompromised state.   Neurological: Negative for dizziness.   Psychiatric/Behavioral: Negative for agitation.      Physical Exam:    Blood pressure 149/91, pulse 102, height 5' 11\" (1.803 m), weight 117 kg (258 lb).    General/Constitutional: NAD, well developed, well nourished  HENT: Normocephalic, atraumatic  CV: Intact distal pulses, regular rate  Resp: No respiratory distress or labored breathing  GI: Soft and non-tender   Lymphatic: No lymphadenopathy palpated  Neuro: Alert and Oriented x 3, no focal deficits  Psych: Normal mood, normal affect, normal judgement, normal behavior  Skin: Warm, dry, no rashes, no erythema      Knee Exam (focused):                RIGHT LEFT   ROM:   0-130 0-130   Palpation: Effusion negative negative     MJL tenderness Positive Positive     LJL tenderness Negative Negative   Meniscus: [place] Negative Negative    Apley's Compression Negative Negative   Instability: Varus stable stable     Valgus stable stable   Special Tests: Lachman Negative Negative     Posterior drawer Negative Negative     Anterior drawer Negative Negative     Pivot shift not tested not tested     Dial not " tested not tested   Patella: Palpation no tenderness no tenderness  Osgood Schlatter present     Mobility 1/4 1/4     Apprehension Negative Negative   Other: Patellar grind Positive Positive      LE NV Exam: +2 DP/PT pulses bilaterally  Sensation intact to light touch L2-S1 bilaterally     Bilateral hip ROM demonstrates no pain actively or passively    No calf tenderness to palpation bilaterally    Knee Imaging    X-rays of the bilateral knee were reviewed, which demonstrate moderate degenerative changes to the patellar compartment.  I have reviewed the radiology report and do not currently have a radiology reading from [facility], but will check the result once the reading is performed.      Scribe Attestation      I,:  Marylin Duncan am acting as a scribe while in the presence of the attending physician.:       I,:  Ulises Olivo, DO personally performed the services described in this documentation    as scribed in my presence.:

## 2024-11-19 ENCOUNTER — HOSPITAL ENCOUNTER (OUTPATIENT)
Dept: ULTRASOUND IMAGING | Facility: HOSPITAL | Age: 54
Discharge: HOME | End: 2024-11-19
Payer: COMMERCIAL

## 2024-11-19 DIAGNOSIS — M25.832 MASS OF JOINT OF LEFT WRIST: ICD-10-CM

## 2024-11-19 PROCEDURE — 76882 US LMTD JT/FCL EVL NVASC XTR: CPT

## 2024-11-25 ENCOUNTER — RESULTS FOLLOW-UP (OUTPATIENT)
Dept: FAMILY MEDICINE CLINIC | Facility: CLINIC | Age: 54
End: 2024-11-25

## 2024-11-25 DIAGNOSIS — M67.432 GANGLION CYST OF VOLAR ASPECT OF LEFT WRIST: Primary | ICD-10-CM

## 2024-11-26 ENCOUNTER — EVALUATION (OUTPATIENT)
Dept: PHYSICAL THERAPY | Facility: CLINIC | Age: 54
End: 2024-11-26
Payer: COMMERCIAL

## 2024-11-26 DIAGNOSIS — G89.29 CHRONIC PAIN OF BOTH KNEES: Primary | ICD-10-CM

## 2024-11-26 DIAGNOSIS — M25.561 CHRONIC PAIN OF BOTH KNEES: Primary | ICD-10-CM

## 2024-11-26 DIAGNOSIS — M25.562 PAIN IN BOTH KNEES, UNSPECIFIED CHRONICITY: ICD-10-CM

## 2024-11-26 DIAGNOSIS — Z87.39 HISTORY OF OSGOOD-SCHLATTER DISEASE: ICD-10-CM

## 2024-11-26 DIAGNOSIS — M25.562 CHRONIC PAIN OF BOTH KNEES: Primary | ICD-10-CM

## 2024-11-26 DIAGNOSIS — M17.0 PRIMARY OSTEOARTHRITIS OF BOTH KNEES: ICD-10-CM

## 2024-11-26 DIAGNOSIS — M25.561 PAIN IN BOTH KNEES, UNSPECIFIED CHRONICITY: ICD-10-CM

## 2024-11-26 PROCEDURE — 97110 THERAPEUTIC EXERCISES: CPT

## 2024-11-26 PROCEDURE — 97161 PT EVAL LOW COMPLEX 20 MIN: CPT

## 2024-11-26 NOTE — PROGRESS NOTES
PT Evaluation     Today's date: 2024  Patient name: Enrrique Diaz  : 1970  MRN: [id number]  Referring provider: Ulises Olivo DO  Dx:   Encounter Diagnosis     ICD-10-CM    1. Chronic pain of both knees  M25.561 Ambulatory Referral to Physical Therapy    M25.562     G89.29       2. Pain in both knees, unspecified chronicity  M25.561 Ambulatory Referral to Physical Therapy    M25.562       3. History of Osgood-Schlatter disease  Z87.39 Ambulatory Referral to Physical Therapy      4. Primary osteoarthritis of both knees  M17.0 Ambulatory Referral to Physical Therapy          Start Time: 1245  Stop Time: 1330  Total time in clinic (min): 45 minutes    Assessment  Impairments: abnormal coordination, abnormal muscle firing, abnormal muscle tone, abnormal or restricted ROM, abnormal movement, activity intolerance, impaired physical strength, lacks appropriate home exercise program, pain with function, participation limitations, activity limitations and endurance  Symptom irritability: moderate    Assessment details: Enrrique Diaz is a pleasant 53 y.o. male who presents with BL knee pain L>R, secondary to suspected diagnosis of patellofemoral osteoarthritis. He currently demonstrates limitations with BL knee ROM, NM strength and balance deficits. Functional impairments include difficulty with ambualting, standing, stair navigation, squatting, bending, and lunging. Based on listed impairments, Enrrique Diaz would benefit from formal physical therapy to address impairments as detailed, decrease pain, and restore maximal level of function for all home, work, and mobility tasks. All patient questions and concerns have been addressed at this time. Thank you for this referral.  Understanding of Dx/Px/POC: good     Prognosis: good    Goals  Short Term Goals:   1. Patient will be independent with initial customized HEP in 1 week  2. Patient will report at least 40% improvement overall with lunging to ties his shoes in  2-4 weeks.  3. Patient will decrease pain with activity by 2 points on VAS with in 2-4 weeks.  4. Patient will demonstrate improved BL LE MMT to > 4+/5 in all directions in 2-4 weeks  5. Patient will report improved abiltiy to stand for > 30min in 2-4 weeks    Long Term Goals:   1. Patient will improve FOTO to greater than target goal in 6-8 weeks  2. Patient will eliminiate pain with activity in 6-8 weeks  3. Patient will continue with HEP independence to allow for decreased future reoccurrence of pain and loss in function in 6-8 weeks  4. Patient will report at least 80% improvement overall with participating in pickle ball games in 6-8 weeks.   5. Patient will report ability to walk > 2miles with no issues by discharge.    Plan  Patient would benefit from: skilled physical therapy and PT eval  Planned modality interventions: low level laser therapy, cryotherapy, electrical stimulation/Russian stimulation, TENS and thermotherapy: hydrocollator packs    Planned therapy interventions: IASTM, joint mobilization, kinesiology taping, massage, manual therapy, [place] taping, balance/weight bearing training, body mechanics training, neuromuscular re-education, nerve gliding, coordination, strengthening, stretching, therapeutic activities, therapeutic exercise, home exercise program, graded exercise and graded motor    Frequency: 1-2x week  Duration in weeks: 8  Plan of Care beginning date: 11/26/2024  Plan of Care expiration date: 1/14/2025  Treatment plan discussed with: patient  Plan details: Prognosis above is given PT services 2x/week tapering to 1x/week over the next 6-8 weeks and home program adherence.        Subjective Evaluation    History of Present Illness  Mechanism of injury: Patient is 53 y.o. male that presents to PT with CC of BL knee pain L>R that has been going on for the past couple of months. Was able to see orthopedic and he recommended him to try PT before trying cortisone shots and further  referrals. Reports that he has not been able to participate in many active things since he feels limited by pain. Patient reports the pain is reproduced with squatting, playing pickle ball and having to stand and walk for long periods. Patient's goals from PT include to decrease his pain and avoid surgery if necessary.        Quality of life: good    Patient Goals  Patient goals for therapy: decreased pain, improved balance, increased strength, independence with ADLs/IADLs, return to sport/leisure activities and increased motion    Pain  Current pain ratin  At best pain ratin  At worst pain ratin  Location: Anterior knee  Quality: grinding and discomfort  Relieving factors: ice, heat and medications  Aggravating factors: walking, standing and sitting    Social Support  Steps to enter house: yes  Stairs in house: yes   Lives in: multiple-level home  Lives with: spouse    Employment status: working ([employer])  Exercise history: Pickle ball      Diagnostic Tests  X-ray: abnormal        Objective     Neurological Testing     Sensation     Knee   Left Knee   Intact: Light touch    Right Knee   Intact: light touch     Active Range of Motion   Left Knee   Flexion: 120 degrees   Extension: 0 degrees     Right Knee   Flexion: 120 degrees   Extension: 0 degrees     Passive Range of Motion   Left Knee   Flexion: 125 degrees   Extension: 0 degrees     Right Knee   Flexion: 120 degrees   Extension: 0 degrees     Mobility   Patellar Mobility:   Left Knee   WFL: medial, lateral, superior and inferior.     Right Knee   WFL: medial and lateral  Hypomobile: superior and inferior     Strength/Myotome Testing     Left Hip   Planes of Motion   Flexion: 4+  Extension: 4+  Abduction: 4  Adduction: 4+  External rotation: 4+  Internal rotation: 4+    Right Hip   Planes of Motion   Flexion: 4+  Extension: 4+  Abduction: 4  Adduction: 4+  External rotation: 4+  Internal rotation: 4+    Left Knee   Flexion: 5  Extension: 5    Right  "Knee   Flexion: 5  Extension: 5    Tests     Left Knee   Negative patellar apprehension.     Right Knee   Negative patellar apprehension.     Functional Assessment      Squat    Left tibial anterior translation beyond toes and right tibial anterior translation beyond toes.     Lunge   Left Leg  Contralateral trunk side bending.     Right Leg  Contralateral trunk side bending.     Comments  30 sec chair rise: 14 STS  SLS: >15sec BL             Precautions: BMI > 30, Ganglion cyst, HTH  Access Code: [email or web address]  URL: [email or web address]  Date: 11/26/2024  Prepared by: Mike Au    Exercises  - Supine Bridge  - 1 x daily - 7 x weekly - 3 sets - 10 reps  - Sidelying Hip Abduction  - 1 x daily - 7 x weekly - 3 sets - 10 reps  - Seated Hamstring Stretch  - 1 x daily - 7 x weekly - 3 sets - 30 hold  - Long Sitting Calf Stretch with Strap  - 1 x daily - 7 x weekly - 3 sets - 30 hold  - Clamshell with Resistance  - 1 x daily - 7 x weekly - 2 sets - 10 reps - 5 hold    Manuals IE 11/26                     Knee ROM HB                     Patellar mobs                      STM                                               Neuro Re-Ed                       Quad sets                      SLR                           LAQ                       Clamshell 3x10x5\" hold    BTB    HEP                      Bridge 3x10                                                                     Ther Ex                       Heel slides nv                     SL Abd 3x10    HEP                     Gastroc stretch  3x30\"    HEP                     Hamstring stretch 3x30\"    HEP            Prone extension                       HR/TR                      Mini squat                                                                       Ther Activity                                                                       Gait Training                                                                       Modalities                          "

## 2024-12-05 ENCOUNTER — APPOINTMENT (OUTPATIENT)
Dept: PHYSICAL THERAPY | Facility: CLINIC | Age: 54
End: 2024-12-05
Payer: COMMERCIAL

## 2024-12-09 ENCOUNTER — OFFICE VISIT (OUTPATIENT)
Dept: PHYSICAL THERAPY | Facility: CLINIC | Age: 54
End: 2024-12-09
Payer: COMMERCIAL

## 2024-12-09 DIAGNOSIS — M17.0 PRIMARY OSTEOARTHRITIS OF BOTH KNEES: ICD-10-CM

## 2024-12-09 DIAGNOSIS — M25.561 PAIN IN BOTH KNEES, UNSPECIFIED CHRONICITY: ICD-10-CM

## 2024-12-09 DIAGNOSIS — M25.562 PAIN IN BOTH KNEES, UNSPECIFIED CHRONICITY: ICD-10-CM

## 2024-12-09 DIAGNOSIS — M25.561 CHRONIC PAIN OF BOTH KNEES: Primary | ICD-10-CM

## 2024-12-09 DIAGNOSIS — G89.29 CHRONIC PAIN OF BOTH KNEES: Primary | ICD-10-CM

## 2024-12-09 DIAGNOSIS — M25.562 CHRONIC PAIN OF BOTH KNEES: Primary | ICD-10-CM

## 2024-12-09 DIAGNOSIS — Z87.39 HISTORY OF OSGOOD-SCHLATTER DISEASE: ICD-10-CM

## 2024-12-09 PROCEDURE — 97140 MANUAL THERAPY 1/> REGIONS: CPT

## 2024-12-09 PROCEDURE — 97110 THERAPEUTIC EXERCISES: CPT

## 2024-12-09 NOTE — PROGRESS NOTES
"Daily Note     Today's date: 2024  Patient name: Enrrique Diaz  : 1970  MRN: [id number]  Referring provider: Ulises Olivo DO  Dx:   Encounter Diagnosis     ICD-10-CM    1. Chronic pain of both knees  M25.561     M25.562     G89.29       2. Pain in both knees, unspecified chronicity  M25.561     M25.562       3. History of Osgood-Schlatter disease  Z87.39       4. Primary osteoarthritis of both knees  M17.0           Start Time: 1525  Stop Time: 1610  Total time in clinic (min): 45 minutes    Subjective: Patient reports today stating that he was sick last week and was not able to come in for his session. Reports that his knees have been alright but he has not been working on exercises \"faithfully\". Reports that he and his job went separate ways and will be done with his insurance coverage by end of the month and would like to be done with therapy by next week.     Objective: See treatment diary below      Assessment: Tolerated treatment well. Updated HEP for patient today to progress proximal stability and ROM as tolerated. Patient tolerated PNF contract relax technique with hamstring stretching. Patient demonstrated fatigue post treatment, exhibited good technique with therapeutic exercises, and would benefit from continued PT      Plan: Continue per plan of care.      Precautions: BMI > 30, Ganglion cyst, [facility]  Access Code: [email or web address]  Manuals IE   12/9                   Knee ROM HB  HB                   Patellar mobs                      STM                       Stretching    Hamstring 3x30\" and contract relax 3x ea    Adductors and ITB bilat 3x30\"                   Neuro Re-Ed                       Quad sets                      SLR                           LAQ                       Clamshell 3x10x5\" hold    BTB    HEP  HEP                    Bridge 3x10  HEP                                                                   Ther Ex                       Heel slides nv                     SL " "Abd 3x10    HEP  HEP                   Gastroc stretch  3x30\"    HEP                     Hamstring stretch 3x30\"    HEP            Prone extension    2x10   ea                   HR/TR                      Wall squat    2x10    ea                    Lateral band walking    3x10ft    GTB                    Monster walks    3x10ft    GTB                   ITB stretch  3x30\"           Ther Activity                                                                       Gait Training                                                                       Modalities                                                                                "

## 2024-12-12 ENCOUNTER — APPOINTMENT (OUTPATIENT)
Dept: PHYSICAL THERAPY | Facility: CLINIC | Age: 54
End: 2024-12-12
Payer: COMMERCIAL

## 2024-12-18 VITALS
BODY MASS INDEX: 36.12 KG/M2 | SYSTOLIC BLOOD PRESSURE: 143 MMHG | HEIGHT: 71 IN | WEIGHT: 258 LBS | DIASTOLIC BLOOD PRESSURE: 89 MMHG | HEART RATE: 96 BPM

## 2024-12-18 DIAGNOSIS — M67.432 GANGLION CYST OF VOLAR ASPECT OF LEFT WRIST: Primary | ICD-10-CM

## 2024-12-18 PROCEDURE — 20612 ASPIRATE/INJ GANGLION CYST: CPT | Performed by: ORTHOPAEDIC SURGERY

## 2024-12-18 PROCEDURE — 99213 OFFICE O/P EST LOW 20 MIN: CPT | Performed by: ORTHOPAEDIC SURGERY

## 2024-12-18 RX ORDER — LIDOCAINE HYDROCHLORIDE 10 MG/ML
3 INJECTION, SOLUTION INFILTRATION; PERINEURAL
Status: COMPLETED | OUTPATIENT
Start: 2024-12-18 | End: 2024-12-18

## 2024-12-18 RX ADMIN — LIDOCAINE HYDROCHLORIDE 3 ML: 10 INJECTION, SOLUTION INFILTRATION; PERINEURAL at 15:00

## 2024-12-18 NOTE — PROGRESS NOTES
The HAND & UPPER EXTREMITY OFFICE VISIT   Referred By:  Marino Uribe  [address]  [address]  [place] [address]      Chief Complaint:     Left wrist mass    History of Present Illness:   54 y.o., right hand dominant male presents to the office today for evaluation of volar left wrist mass. He states this has been ongoing since the beginning of June. He denies any injury or trauma. He states it has been getting larger in size. He notes some achy pain with use. He also notes some tightness. He denies any numbness or tingling. He denies prior surgery on the hand.        ADLs: Community ambulator  Smoke: denies   ETOH: social   Drugs:  denies  Job: sales       Past Medical History:  Past Medical History:   Diagnosis Date    Arthritis 2019    Both knees    Hypothyroid      Past Surgical History:   Procedure Laterality Date    WISDOM TOOTH EXTRACTION       Family History   Problem Relation Age of Onset    Heart disease Father      Social History     Socioeconomic History    Marital status: /Civil Union     Spouse name: Not on file    Number of children: Not on file    Years of education: Not on file    Highest education level: Not on file   Occupational History    Not on file   Tobacco Use    Smoking status: Never    Smokeless tobacco: Never   Vaping Use    Vaping status: Never Used   Substance and Sexual Activity    Alcohol use: Yes     Alcohol/week: 6.0 standard drinks of alcohol     Types: 6 Glasses of wine per week    Drug use: Never    Sexual activity: Yes     Partners: Female     Birth control/protection: None   Other Topics Concern    Not on file   Social History Narrative    Not on file     Social Drivers of Health     Financial Resource Strain: Not on file   Food Insecurity: Not on file   Transportation Needs: Not on file   Physical Activity: Not on file   Stress: Not on file   Social Connections: Not on file   Intimate Partner Violence: Not on file   Housing Stability: Not on file  "    Scheduled Meds:  Continuous Infusions:No current facility-administered medications for this visit.    PRN Meds:.  No Known Allergies        Physical Examination:    /89   Pulse 96   Ht 5' 11\" (1.803 m)   Wt 117 kg (258 lb)   BMI 35.98 kg/m²     Gen: A&Ox3, NAD  Cardiac: regular rate  Chest: non labored breathing  Abdomen: Non-distended    Left Upper Extremity:  Skin CDI  No obvious deformity of the shoulder, arm, elbow, forearm, wrist, hand  Volar ganglion cyst approximately 2 x 2 cm diameter, soft, minimally tender  Full fist  Some pain with terminal wrist flexion  Sensation intact to light touch in the axillary median, ulnar, and radial nerve distributions  5/5 motor   2+RP  strength      Studies:  :I personally reviewed and independently interpreted the available imaging.  11/19/24: US of the left wrist, multiple views, demonstrate volar ganglion cyst.       Assessment and Plan:  1. Ganglion cyst of volar aspect of left wrist  Ambulatory Referral to Orthopedic Surgery    Hand/upper extremity injection: L wrist          54 y.o. male presents with signs and symptoms consistent with the above diagnosis.  We discussed the natural history of this condition and its pathogenesis.  We discussed operative and nonoperative treatment options.    The US was reviewed in the office today. Discussed with the patient we can consider monitoring if this is not bothersome for him versus aspiration versus surgical intervention.  We discussed the risk, benefits and recurrence rates of each option.  The patient elected to proceed with aspiration.  He understands that there is about a 50 to 60% recurrence rate after aspiration.  He understands additional risks include damage to the median nerve or radial artery, incomplete relief of symptoms, infection, bleeding.  And this was performed in the office today without any complications, please see procedure note for details. 2cc of yellow fluid was aspirated. He may " follow up with me as needed.    he expressed understanding of the plan and agreed. We encouraged them to contact our office with any questions or concerns.     Hand/upper extremity injection: L wrist  Universal Protocol:  procedure performed by consultantConsent: Verbal consent obtained.  Consent given by: patient  Patient identity confirmed: verbally with patient  Supporting Documentation  Indications: pain   Procedure Details  Condition:volar carpal ganglion Site: L wrist   Needle size: 25 G (18 G used for aspiration)  Medications administered: 3 mL lidocaine 1 %  Aspirate amount (ml): 2CC.  Patient tolerance: patient tolerated the procedure well with no immediate complications  Dressing:  Sterile dressing applied             Scribe Attestation      I,:  Alysa Gonzalez MA am acting as a scribe while in the presence of the attending physician.:       I,:  Daniele Moe MD personally performed the services described in this documentation    as scribed in my presence.:             *This note was dictated using Dragon voice recognition software. Please excuse any word substitutions or errors.*

## 2024-12-19 ENCOUNTER — APPOINTMENT (OUTPATIENT)
Dept: PHYSICAL THERAPY | Facility: CLINIC | Age: 54
End: 2024-12-19
Payer: COMMERCIAL

## 2025-02-25 ENCOUNTER — TELEPHONE (OUTPATIENT)
Dept: OBGYN CLINIC | Facility: MEDICAL CENTER | Age: 55
End: 2025-02-25

## 2025-05-02 ENCOUNTER — OFFICE VISIT (OUTPATIENT)
Dept: CARDIOLOGY CLINIC | Facility: CLINIC | Age: 55
End: 2025-05-02

## 2025-05-02 VITALS
HEIGHT: 71 IN | BODY MASS INDEX: 34.86 KG/M2 | DIASTOLIC BLOOD PRESSURE: 90 MMHG | HEART RATE: 90 BPM | WEIGHT: 249 LBS | SYSTOLIC BLOOD PRESSURE: 142 MMHG

## 2025-05-02 DIAGNOSIS — I25.10 CORONARY ARTERY CALCIFICATION SEEN ON CT SCAN: ICD-10-CM

## 2025-05-02 DIAGNOSIS — Z82.49 FAMILY HISTORY OF EARLY CAD: ICD-10-CM

## 2025-05-02 DIAGNOSIS — I65.23 CAROTID STENOSIS, BILATERAL: ICD-10-CM

## 2025-05-02 DIAGNOSIS — I25.10 CORONARY ARTERY DISEASE INVOLVING NATIVE CORONARY ARTERY OF NATIVE HEART WITHOUT ANGINA PECTORIS: Primary | ICD-10-CM

## 2025-05-02 DIAGNOSIS — I10 ESSENTIAL HYPERTENSION: ICD-10-CM

## 2025-05-02 PROCEDURE — 99244 OFF/OP CNSLTJ NEW/EST MOD 40: CPT | Performed by: INTERNAL MEDICINE

## 2025-05-02 RX ORDER — ATORVASTATIN CALCIUM 20 MG/1
20 TABLET, FILM COATED ORAL DAILY
Qty: 90 TABLET | Refills: 3 | Status: SHIPPED | OUTPATIENT
Start: 2025-05-02 | End: 2025-07-31

## 2025-05-02 NOTE — PROGRESS NOTES
Cardiology Office Note  MD Miller Hawkins MD, [facility]  Ryan Peralta DO, [facility]  MD Livia Delatorre DO, [facility]  Alireza Welch DO, [facility]  [place] [address]    Enrrique Diaz 54 y.o. male MRN: [id number]  Unit/Bed#:  Encounter: [id number]      History of Present Illness:  It was a pleasure to see Enrrique Diaz in the office today for initial CV evaluation. CAD with coronary calcifications by CT chest in November 2024 and hypothyroid.  He has a family history of CAD with maternal grandfather having had CABG.  He establish care with us in May 2025.  Due to the patient's family history, he was sent for CT coronary calcium score in November 2024.  Patient was found to have elevated calcium score at 214.  Overall, the patient had been feeling well in his usual state of health.  He was physically active without any exertional symptoms walking his dog on a regular basis.  Blood pressures in the office with his primary care doctor were found to be persistently elevated in the 140s there is encounter in May 2025.  He is here today to establish care and to review the results of his calcium score.    He denies any chest pain, pressure, tightness or squeezing.  Denies lightheadedness, dizziness or palpitations.  Denies lower extremity swelling, orthopnea or paroxysmal nocturnal dyspnea.    Review of Systems:  Review of Systems   Constitutional: Negative for decreased appetite, fever, weight gain and weight loss.   HENT:  Negative for congestion and sore throat.    Eyes:  Negative for visual disturbance.   Cardiovascular:  Negative for chest pain, dyspnea on exertion, leg swelling, near-syncope and palpitations.   Respiratory:  Negative for cough and shortness of breath.    Hematologic/Lymphatic: Negative for bleeding problem.   Skin:  Negative for rash.   Musculoskeletal:  Negative for  myalgias and neck pain.   Gastrointestinal:  Negative for abdominal pain and nausea.   Neurological:  Negative for light-headedness and weakness.   Psychiatric/Behavioral:  Negative for depression.        Past Medical History:   Diagnosis Date    Arthritis 2019    Both knees    Hypothyroid        Past Surgical History:   Procedure Laterality Date    WISDOM TOOTH EXTRACTION         Social History     Socioeconomic History    Marital status: /Civil Union     Spouse name: Not on file    Number of children: Not on file    Years of education: Not on file    Highest education level: Not on file   Occupational History    Not on file   Tobacco Use    Smoking status: Never    Smokeless tobacco: Never   Vaping Use    Vaping status: Never Used   Substance and Sexual Activity    Alcohol use: Yes     Alcohol/week: 6.0 standard drinks of alcohol     Types: 6 Glasses of wine per week    Drug use: Never    Sexual activity: Yes     Partners: Female     Birth control/protection: None   Other Topics Concern    Not on file   Social History Narrative    Not on file     Social Drivers of Health     Financial Resource Strain: Not on file   Food Insecurity: Not on file   Transportation Needs: Not on file   Physical Activity: Not on file   Stress: Not on file   Social Connections: Not on file   Intimate Partner Violence: Not on file   Housing Stability: Not on file       Family History   Problem Relation Age of Onset    Heart disease Father        No Known Allergies      Current Outpatient Medications:     atorvastatin (LIPITOR) 20 mg tablet, Take 1 tablet (20 mg total) by mouth daily, Disp: 90 tablet, Rfl: 3    Diclofenac Sodium (VOLTAREN) 1 %, Apply 2 g topically 4 (four) times a day As needed., Disp: , Rfl:     levothyroxine 25 mcg tablet, Take 1 tablet (25 mcg total) by mouth daily in the early morning, Disp: 100 tablet, Rfl: 3    multivitamin (THERAGRAN) TABS, Take 1 tablet by mouth daily, Disp: , Rfl:     Vitamin D,  "Ergocalciferol, 83173 units CAPS, Take by mouth, Disp: , Rfl:     Vitals:    05/02/25 1351   BP: 142/90   BP Location: Right arm   Patient Position: Sitting   Cuff Size: Large   Pulse: 90   Weight: 113 kg (249 lb)   Height: 5' 11\" (1.803 m)     Body mass index is 34.73 kg/m².    PHYSICAL EXAMINATION:  Gen: Awake, Alert, NAD   Head/eyes: AT/NC, pupils equal and round, Anicteric  ENT: mmm  Neck: Supple, No elevated JVP, trachea midline  Resp: CTA bilaterally no w/r/r  CV: RRR +S1, S2, No m/r/g  Abd: Soft, obese, NT/ND + BS  Ext: no LE edema bilaterally  Neuro: Follows commands, moves all extermities  Psych: Appropriate affect, normal mood, pleasant attitude, non-combative  Skin: warm; no rash, erythema or venous stasis changes on exposed skin    --------------------------------------------------------------------------------  TREADMILL STRESS  No results found for this or any previous visit.     --------------------------------------------------------------------------------  NUCLEAR STRESS TEST: No results found for this or any previous visit.    No results found for this or any previous visit.      --------------------------------------------------------------------------------  CATH:  No results found for this or any previous visit.    --------------------------------------------------------------------------------  ECHO:   No results found for this or any previous visit.    No results found for this or any previous visit.    --------------------------------------------------------------------------------  HOLTER  No results found for this or any previous visit.    No results found for this or any previous visit.    --------------------------------------------------------------------------------  CAROTIDS  Results for orders placed during the hospital encounter of 11/11/24    VAS carotid complete study    Narrative  [facility] REPORT  CLINICAL:  Indications:  Patient presents with an pulsatile tinnitus in " "his left ear as  well as strong family history of carotid and coronary artery disease.  Patient  is asymptomatic from a cerebral vascular standpoint.  Operative History:  No Cardiovascular Surgeries  Risk Factors  The patient has no pertinent history.  Clinical  Right Pressure:  138/ mm Hg, Left Pressure:  138/ mm Hg.  Height: 5' 11\"   Weight: 260 lbs    FINDINGS:    Right        Impression  PSV  EDV (cm/s)  Direction of Flow  Ratio  Dist. ICA                 64          23                      0.60  Mid. ICA                  58          27                      0.54  Prox. ICA    1 - 49%      77          33                      0.72  Dist CCA                  88          20  Mid CCA                  107          28                      1.09  Prox CCA                  98          24  Ext Carotid              104          14                      0.97  Prox Vert                 37          10  Antegrade  Subclavian               232          16  Innominate               118          20    Left         Impression  PSV  EDV (cm/s)  Direction of Flow  Ratio  Dist. ICA                 79          30                      0.69  Mid. ICA                  66          32                      0.57  Prox. ICA    1 - 49%      72          30                      0.63  Dist CCA                  88          27  Mid CCA                  115          24                      1.19  Prox CCA                  97          24  Ext Carotid               56          10                      0.48  Prox Vert                 48          15  Antegrade  Subclavian               142           0        CONCLUSION:    Impression  RIGHT:  There is <50% stenosis noted in the internal carotid artery. Plaque is  homogenous and smooth.  Vertebral artery flow is antegrade. There is no significant subclavian artery  disease.  LEFT:  There is <50% stenosis noted in the internal carotid artery. Plaque is  homogenous and smooth.  Vertebral artery flow is " "antegrade. There is no significant subclavian artery  disease.    No previous study available for comparison.    SIGNATURE:  Electronically Signed by: JANES DUFFY MD on 2024-11-11 09:32:03 AM     --------------------------------------------------------------------------------  ECGs:  No results found for this visit on 05/02/25.     Lab Results   Component Value Date    WBC 5.85 03/13/2024    HGB 15.1 03/13/2024    HCT 45.0 03/13/2024    MCV 92 03/13/2024     03/13/2024      Lab Results   Component Value Date    SODIUM 139 03/13/2024    K 4.6 03/13/2024     03/13/2024    CO2 26 03/13/2024    BUN 17 03/13/2024    CREATININE 0.98 03/13/2024    GLUC 104 (H) 02/24/2020    CALCIUM 9.5 03/13/2024      Lab Results   Component Value Date    HGBA1C 5.3 03/12/2020      No results found for: \"CHOL\"  Lab Results   Component Value Date    HDL 37 (L) 03/13/2024    HDL 45 02/24/2020     Lab Results   Component Value Date    LDLCALC 98 03/13/2024    LDLCALC 147 (H) 02/24/2020     Lab Results   Component Value Date    TRIG 102 03/13/2024    TRIG 114 02/24/2020     No results found for: \"CHOLHDL\"   No results found for: \"INR\", \"PROTIME\"     1. Coronary artery disease involving native coronary artery of native heart without angina pectoris  -     POCT ECG  -     atorvastatin (LIPITOR) 20 mg tablet; Take 1 tablet (20 mg total) by mouth daily  2. Family history of early CAD  -     POCT ECG  3. Carotid stenosis, bilateral      IMPRESSION:    CAD with CT coronary calcium score of 214, November 2024  Hypertension  Carotid stenosis <50% bilaterally, November 2024  Obesity  Family history of CAD  Hypothyroid    PLAN:  It was a pleasure to see Enrrique in the office today for initial CV evaluation.  He is here today due to his abnormal CT coronary calcium score.  Calcium score was moderately elevated with a score of 214.  He has no chest pain concerning for angina and no signs or symptoms of heart failure.  Clinically he examines " "to be euvolemic.  Blood pressure is mildly elevated but heart rate is stable.  He is tolerating his current medications without reported adverse effects.  The patient can perform greater than 4 METS on a daily basis without significant exertional symptoms.  Based on his clinical presentation, I have the following recommendations:    1.  Recommend institution of statin with atorvastatin 20 mg daily.  Repeat lipid panel in 6 months.  Goal LDL is less than 50 mg/dL by the guidelines.  If LDL is not to goal in 6 months, would recommend increasing statin.  2.  Check 2D echocardiogram to assess cardiac structure and function with history of hypertension we discussed initiation of antihypertensive medication.  3.  Would start him on amlodipine 5 mg daily, but the patient has declined for now and would like to think about it.  If he chooses to initiate the medication, risk and benefits have been discussed.  Would repeat a blood pressure in 2 weeks after initiation of the medication.  For now, he will try aggressive dietary modifications  4.  Recommend a heart healthy diet low in sodium and carbohydrate.  Salt restriction less than 1500 mg/day.  5.  We also discussed a CTA of the coronary arteries versus stress testing for completeness.  As the patient is asymptomatic, reasonable to hold off for now.  Risk and benefits discussed.  6.  Will follow-up with him after echocardiogram to review the results.    As always, please do not hesitate to call with any questions.    Portions of the record may have been created with voice recognition software. Occasional wrong word or \"sound a like\" substitutions may have occurred due to the inherent limitations of voice recognition software. Read the chart carefully and recognize, using context, where substitutions have occurred.      Signed: Ryan Peralta DO, FACC, FASE, FASNC, FACP  "